# Patient Record
Sex: FEMALE | Race: OTHER | NOT HISPANIC OR LATINO | URBAN - METROPOLITAN AREA
[De-identification: names, ages, dates, MRNs, and addresses within clinical notes are randomized per-mention and may not be internally consistent; named-entity substitution may affect disease eponyms.]

---

## 2018-03-21 VITALS
DIASTOLIC BLOOD PRESSURE: 68 MMHG | SYSTOLIC BLOOD PRESSURE: 141 MMHG | OXYGEN SATURATION: 98 % | HEIGHT: 64 IN | RESPIRATION RATE: 16 BRPM | HEART RATE: 60 BPM | WEIGHT: 156.09 LBS

## 2018-03-21 NOTE — H&P ADULT - HISTORY OF PRESENT ILLNESS
67F c/o low back pain x    Presents today for elective Lami L5-S1, PSF L4-S1, possible PLIF, pelvic fixation, iliac crest bone graft 67F c/o low back pain worsened over time without improvement radiating down both legs, right worse than left. Denies numbness, tingling. Ambulates without assist. Presents today for elective Lami/PSF L4-S1, ICBG. 67F c/o low back pain worsened over time without improvement radiating down both legs, right worse than left. Denies numbness, tingling. Ambulates without assist. Presents today for elective Lami/PSF L4-S1, ICBG. Pt states itching with tylenol and codeine, takes aleve for pain.

## 2018-03-21 NOTE — H&P ADULT - NSHPPHYSICALEXAM_GEN_ALL_CORE
Head normal, atraumatic. Skin warm, dry and intact  MSK: +decreased ROM secondary to pain, lumbosacral spine    Remainder of physical exam per medical clearance note B/L LE: Decreased ROM secondary to pain, sensation intact, DP 2+, brisk cap refill, EHL/FHL/TA/GS 5/5  Rest of PE per MD clearance

## 2018-03-21 NOTE — H&P ADULT - PROBLEM SELECTOR PLAN 1
Admit to Orthopaedic Service.  Presents today for elective Lami L5-S1, PSF L4-S1, possible PLIF, pelvic fixation, iliac crest bone graft  Pt medically stable and cleared for procedure today by Admit to Orthopaedic Service.  Presents today for elective Lami L5-S1, PSF L4-S1, possible PLIF, pelvic fixation, iliac crest bone graft  Pt medically stable and cleared for procedure today by Dr Leonardo Mcdonough Admit to Orthopaedic Service.  Presents today for elective Lami/PSF L4-S1, ICBG.  Pt cleared for procedure today by Dr. Leonardo Mcdonough

## 2018-03-21 NOTE — PATIENT PROFILE ADULT. - NS PRO CONTRA FLU CONTRAIND
none Allergy sensitivity to eggs, thimerosal (if included in the vaccine), or any other component of the vaccine (i.e., aminoglycosides)

## 2018-03-21 NOTE — H&P ADULT - PMH
Asthma    GERD (gastroesophageal reflux disease)    HTN (hypertension)    Hypothyroidism    Spinal stenosis

## 2018-03-21 NOTE — H&P ADULT - NSHPLABSRESULTS_GEN_ALL_CORE
Preop CBC, CMP - WNL per medical clearance   Preop EKG - sinus rhythm; left atrial enlargement Preop CBC, CMP, PT/INR - WNL per medical clearance   UA to be collected DOS  Preop EKG - sinus rhythm; left atrial enlargement Preop CBC, CMP, PT/INR/PTT - WNL per medical clearance   UA, Nares dos  Preop EKG - sinus rhythm; left atrial enlargement

## 2018-03-21 NOTE — PATIENT PROFILE ADULT. - PMH
Spinal stenosis Asthma    GERD (gastroesophageal reflux disease)    HTN (hypertension)    Hypothyroidism    Spinal stenosis

## 2018-03-22 ENCOUNTER — INPATIENT (INPATIENT)
Facility: HOSPITAL | Age: 68
LOS: 3 days | Discharge: HOME | DRG: 460 | End: 2018-03-26
Attending: SPECIALIST | Admitting: SPECIALIST
Payer: COMMERCIAL

## 2018-03-22 ENCOUNTER — TRANSCRIPTION ENCOUNTER (OUTPATIENT)
Age: 68
End: 2018-03-22

## 2018-03-22 DIAGNOSIS — Z98.890 OTHER SPECIFIED POSTPROCEDURAL STATES: Chronic | ICD-10-CM

## 2018-03-22 DIAGNOSIS — E03.9 HYPOTHYROIDISM, UNSPECIFIED: ICD-10-CM

## 2018-03-22 DIAGNOSIS — D64.9 ANEMIA, UNSPECIFIED: ICD-10-CM

## 2018-03-22 DIAGNOSIS — J45.909 UNSPECIFIED ASTHMA, UNCOMPLICATED: ICD-10-CM

## 2018-03-22 DIAGNOSIS — M48.00 SPINAL STENOSIS, SITE UNSPECIFIED: ICD-10-CM

## 2018-03-22 DIAGNOSIS — I10 ESSENTIAL (PRIMARY) HYPERTENSION: ICD-10-CM

## 2018-03-22 DIAGNOSIS — K21.9 GASTRO-ESOPHAGEAL REFLUX DISEASE WITHOUT ESOPHAGITIS: ICD-10-CM

## 2018-03-22 DIAGNOSIS — R68.83 CHILLS (WITHOUT FEVER): ICD-10-CM

## 2018-03-22 LAB
ANION GAP SERPL CALC-SCNC: 10 MMOL/L — SIGNIFICANT CHANGE UP (ref 5–17)
APPEARANCE UR: CLEAR — SIGNIFICANT CHANGE UP
APTT BLD: 30.9 SEC — SIGNIFICANT CHANGE UP (ref 27.5–37.4)
BACTERIA # UR AUTO: PRESENT /HPF
BASE EXCESS BLDA CALC-SCNC: 1.2 MMOL/L — SIGNIFICANT CHANGE UP (ref -2–3)
BILIRUB UR-MCNC: NEGATIVE — SIGNIFICANT CHANGE UP
BUN SERPL-MCNC: 15 MG/DL — SIGNIFICANT CHANGE UP (ref 7–23)
CA-I BLDA-SCNC: 1.13 MMOL/L — SIGNIFICANT CHANGE UP (ref 1.12–1.3)
CALCIUM SERPL-MCNC: 7.9 MG/DL — LOW (ref 8.4–10.5)
CHLORIDE SERPL-SCNC: 105 MMOL/L — SIGNIFICANT CHANGE UP (ref 96–108)
CO2 SERPL-SCNC: 24 MMOL/L — SIGNIFICANT CHANGE UP (ref 22–31)
COHGB MFR BLDA: 0.3 % — SIGNIFICANT CHANGE UP
COLOR SPEC: YELLOW — SIGNIFICANT CHANGE UP
CREAT SERPL-MCNC: 0.51 MG/DL — SIGNIFICANT CHANGE UP (ref 0.5–1.3)
DIFF PNL FLD: NEGATIVE — SIGNIFICANT CHANGE UP
EPI CELLS # UR: SIGNIFICANT CHANGE UP /HPF (ref 0–5)
GAS PNL BLDA: SIGNIFICANT CHANGE UP
GLUCOSE BLDC GLUCOMTR-MCNC: 103 MG/DL — HIGH (ref 70–99)
GLUCOSE SERPL-MCNC: 169 MG/DL — HIGH (ref 70–99)
GLUCOSE UR QL: NEGATIVE — SIGNIFICANT CHANGE UP
HCO3 BLDA-SCNC: 25 MMOL/L — SIGNIFICANT CHANGE UP (ref 21–28)
HCT VFR BLD CALC: 29.9 % — LOW (ref 34.5–45)
HCT VFR BLD CALC: 34.9 % — SIGNIFICANT CHANGE UP (ref 34.5–45)
HGB BLD-MCNC: 11.3 G/DL — LOW (ref 11.5–15.5)
HGB BLD-MCNC: 9.7 G/DL — LOW (ref 11.5–15.5)
HGB BLDA-MCNC: 10.7 G/DL — LOW (ref 11.5–15.5)
INR BLD: 1.1 — SIGNIFICANT CHANGE UP (ref 0.88–1.16)
KETONES UR-MCNC: NEGATIVE — SIGNIFICANT CHANGE UP
LEUKOCYTE ESTERASE UR-ACNC: (no result)
LYMPHOCYTES # BLD AUTO: 7.4 % — LOW (ref 13–44)
MCHC RBC-ENTMCNC: 28.3 PG — SIGNIFICANT CHANGE UP (ref 27–34)
MCHC RBC-ENTMCNC: 28.9 PG — SIGNIFICANT CHANGE UP (ref 27–34)
MCHC RBC-ENTMCNC: 32.4 G/DL — SIGNIFICANT CHANGE UP (ref 32–36)
MCHC RBC-ENTMCNC: 32.4 G/DL — SIGNIFICANT CHANGE UP (ref 32–36)
MCV RBC AUTO: 87.3 FL — SIGNIFICANT CHANGE UP (ref 80–100)
MCV RBC AUTO: 89 FL — SIGNIFICANT CHANGE UP (ref 80–100)
METHGB MFR BLDA: 0.3 % — SIGNIFICANT CHANGE UP
MONOCYTES NFR BLD AUTO: 0.8 % — LOW (ref 2–14)
MRSA PCR RESULT.: NEGATIVE — SIGNIFICANT CHANGE UP
NEUTROPHILS NFR BLD AUTO: 91.8 % — HIGH (ref 43–77)
NITRITE UR-MCNC: NEGATIVE — SIGNIFICANT CHANGE UP
O2 CT VFR BLDA CALC: 15.2 ML/DL — SIGNIFICANT CHANGE UP (ref 15–23)
OXYHGB MFR BLDA: 98 % — SIGNIFICANT CHANGE UP (ref 94–100)
PCO2 BLDA: 38 MMHG — SIGNIFICANT CHANGE UP (ref 32–45)
PH BLDA: 7.44 — SIGNIFICANT CHANGE UP (ref 7.35–7.45)
PH UR: 6 — SIGNIFICANT CHANGE UP (ref 5–8)
PLATELET # BLD AUTO: 148 K/UL — LOW (ref 150–400)
PLATELET # BLD AUTO: 171 K/UL — SIGNIFICANT CHANGE UP (ref 150–400)
PO2 BLDA: 192 MMHG — HIGH (ref 83–108)
POTASSIUM BLDA-SCNC: 4 MMOL/L — SIGNIFICANT CHANGE UP (ref 3.5–4.9)
POTASSIUM SERPL-MCNC: 4.4 MMOL/L — SIGNIFICANT CHANGE UP (ref 3.5–5.3)
POTASSIUM SERPL-SCNC: 4.4 MMOL/L — SIGNIFICANT CHANGE UP (ref 3.5–5.3)
PROT UR-MCNC: NEGATIVE MG/DL — SIGNIFICANT CHANGE UP
PROTHROM AB SERPL-ACNC: 12.2 SEC — SIGNIFICANT CHANGE UP (ref 9.8–12.7)
RBC # BLD: 3.36 M/UL — LOW (ref 3.8–5.2)
RBC # BLD: 4 M/UL — SIGNIFICANT CHANGE UP (ref 3.8–5.2)
RBC # FLD: 13.8 % — SIGNIFICANT CHANGE UP (ref 10.3–16.9)
RBC # FLD: 13.9 % — SIGNIFICANT CHANGE UP (ref 10.3–16.9)
RBC CASTS # UR COMP ASSIST: < 5 /HPF — SIGNIFICANT CHANGE UP
S AUREUS DNA NOSE QL NAA+PROBE: NEGATIVE — SIGNIFICANT CHANGE UP
SAO2 % BLDA: 99 % — SIGNIFICANT CHANGE UP (ref 95–100)
SODIUM BLDA-SCNC: 136 MMOL/L — LOW (ref 138–146)
SODIUM SERPL-SCNC: 139 MMOL/L — SIGNIFICANT CHANGE UP (ref 135–145)
SP GR SPEC: >=1.03 — SIGNIFICANT CHANGE UP (ref 1–1.03)
UROBILINOGEN FLD QL: 0.2 E.U./DL — SIGNIFICANT CHANGE UP
WBC # BLD: 4.4 K/UL — SIGNIFICANT CHANGE UP (ref 3.8–10.5)
WBC # BLD: 8.8 K/UL — SIGNIFICANT CHANGE UP (ref 3.8–10.5)
WBC # FLD AUTO: 4.4 K/UL — SIGNIFICANT CHANGE UP (ref 3.8–10.5)
WBC # FLD AUTO: 8.8 K/UL — SIGNIFICANT CHANGE UP (ref 3.8–10.5)
WBC UR QL: (no result) /HPF

## 2018-03-22 RX ORDER — NALOXONE HYDROCHLORIDE 4 MG/.1ML
0.1 SPRAY NASAL
Qty: 0 | Refills: 0 | Status: DISCONTINUED | OUTPATIENT
Start: 2018-03-22 | End: 2018-03-26

## 2018-03-22 RX ORDER — BISOPROLOL FUMARATE 10 MG/1
1 TABLET, FILM COATED ORAL
Qty: 0 | Refills: 0 | COMMUNITY

## 2018-03-22 RX ORDER — MAGNESIUM HYDROXIDE 400 MG/1
30 TABLET, CHEWABLE ORAL EVERY 12 HOURS
Qty: 0 | Refills: 0 | Status: DISCONTINUED | OUTPATIENT
Start: 2018-03-22 | End: 2018-03-26

## 2018-03-22 RX ORDER — ERGOCALCIFEROL 1.25 MG/1
1 CAPSULE ORAL
Qty: 0 | Refills: 0 | COMMUNITY

## 2018-03-22 RX ORDER — SODIUM CHLORIDE 9 MG/ML
1000 INJECTION, SOLUTION INTRAVENOUS
Qty: 0 | Refills: 0 | Status: DISCONTINUED | OUTPATIENT
Start: 2018-03-22 | End: 2018-03-26

## 2018-03-22 RX ORDER — LEVOTHYROXINE SODIUM 125 MCG
1 TABLET ORAL
Qty: 0 | Refills: 0 | COMMUNITY

## 2018-03-22 RX ORDER — HYDROMORPHONE HYDROCHLORIDE 2 MG/ML
0.5 INJECTION INTRAMUSCULAR; INTRAVENOUS; SUBCUTANEOUS
Qty: 0 | Refills: 0 | Status: DISCONTINUED | OUTPATIENT
Start: 2018-03-22 | End: 2018-03-24

## 2018-03-22 RX ORDER — ALBUTEROL 90 UG/1
2 AEROSOL, METERED ORAL
Qty: 0 | Refills: 0 | COMMUNITY

## 2018-03-22 RX ORDER — INFLUENZA VIRUS VACCINE 15; 15; 15; 15 UG/.5ML; UG/.5ML; UG/.5ML; UG/.5ML
0.5 SUSPENSION INTRAMUSCULAR ONCE
Qty: 0 | Refills: 0 | Status: DISCONTINUED | OUTPATIENT
Start: 2018-03-22 | End: 2018-03-22

## 2018-03-22 RX ORDER — ALBUTEROL 90 UG/1
2 AEROSOL, METERED ORAL EVERY 6 HOURS
Qty: 0 | Refills: 0 | Status: DISCONTINUED | OUTPATIENT
Start: 2018-03-22 | End: 2018-03-26

## 2018-03-22 RX ORDER — AMLODIPINE BESYLATE 2.5 MG/1
5 TABLET ORAL DAILY
Qty: 0 | Refills: 0 | Status: DISCONTINUED | OUTPATIENT
Start: 2018-03-22 | End: 2018-03-26

## 2018-03-22 RX ORDER — ONDANSETRON 8 MG/1
4 TABLET, FILM COATED ORAL EVERY 6 HOURS
Qty: 0 | Refills: 0 | Status: DISCONTINUED | OUTPATIENT
Start: 2018-03-22 | End: 2018-03-26

## 2018-03-22 RX ORDER — AMLODIPINE BESYLATE 2.5 MG/1
1 TABLET ORAL
Qty: 0 | Refills: 0 | COMMUNITY

## 2018-03-22 RX ORDER — BISOPROLOL FUMARATE 10 MG/1
5 TABLET, FILM COATED ORAL DAILY
Qty: 0 | Refills: 0 | Status: DISCONTINUED | OUTPATIENT
Start: 2018-03-22 | End: 2018-03-26

## 2018-03-22 RX ORDER — ALBUMIN HUMAN 25 %
250 VIAL (ML) INTRAVENOUS
Qty: 0 | Refills: 0 | Status: DISCONTINUED | OUTPATIENT
Start: 2018-03-22 | End: 2018-03-26

## 2018-03-22 RX ORDER — HYDROMORPHONE HYDROCHLORIDE 2 MG/ML
30 INJECTION INTRAMUSCULAR; INTRAVENOUS; SUBCUTANEOUS
Qty: 0 | Refills: 0 | Status: DISCONTINUED | OUTPATIENT
Start: 2018-03-22 | End: 2018-03-24

## 2018-03-22 RX ORDER — METOCLOPRAMIDE HCL 10 MG
10 TABLET ORAL ONCE
Qty: 0 | Refills: 0 | Status: DISCONTINUED | OUTPATIENT
Start: 2018-03-22 | End: 2018-03-26

## 2018-03-22 RX ORDER — LEVOTHYROXINE SODIUM 125 MCG
100 TABLET ORAL DAILY
Qty: 0 | Refills: 0 | Status: DISCONTINUED | OUTPATIENT
Start: 2018-03-22 | End: 2018-03-26

## 2018-03-22 RX ORDER — LOSARTAN POTASSIUM 100 MG/1
1 TABLET, FILM COATED ORAL
Qty: 0 | Refills: 0 | COMMUNITY

## 2018-03-22 RX ORDER — CEFAZOLIN SODIUM 1 G
2000 VIAL (EA) INJECTION EVERY 8 HOURS
Qty: 0 | Refills: 0 | Status: COMPLETED | OUTPATIENT
Start: 2018-03-22 | End: 2018-03-23

## 2018-03-22 RX ORDER — LEVOTHYROXINE SODIUM 125 MCG
0 TABLET ORAL
Qty: 0 | Refills: 0 | COMMUNITY

## 2018-03-22 RX ORDER — DOCUSATE SODIUM 100 MG
100 CAPSULE ORAL THREE TIMES A DAY
Qty: 0 | Refills: 0 | Status: DISCONTINUED | OUTPATIENT
Start: 2018-03-22 | End: 2018-03-26

## 2018-03-22 RX ORDER — BUPIVACAINE 13.3 MG/ML
20 INJECTION, SUSPENSION, LIPOSOMAL INFILTRATION ONCE
Qty: 0 | Refills: 0 | Status: DISCONTINUED | OUTPATIENT
Start: 2018-03-22 | End: 2018-03-26

## 2018-03-22 RX ADMIN — HYDROMORPHONE HYDROCHLORIDE 30 MILLILITER(S): 2 INJECTION INTRAMUSCULAR; INTRAVENOUS; SUBCUTANEOUS at 15:37

## 2018-03-22 RX ADMIN — SODIUM CHLORIDE 125 MILLILITER(S): 9 INJECTION, SOLUTION INTRAVENOUS at 22:52

## 2018-03-22 RX ADMIN — Medication 100 MILLIGRAM(S): at 22:51

## 2018-03-22 RX ADMIN — Medication 100 MILLIGRAM(S): at 19:20

## 2018-03-22 RX ADMIN — SODIUM CHLORIDE 125 MILLILITER(S): 9 INJECTION, SOLUTION INTRAVENOUS at 17:21

## 2018-03-22 NOTE — DISCHARGE NOTE ADULT - ADDITIONAL INSTRUCTIONS
No strenuous activity (bending/twisting), heavy lifting, driving or returning to work until cleared by MD.  Change dressing daily with gauze/tape till post-op day #5, then leave incision open to air.  You may shower post-op day#5, keep incision clean and dry.   Try to have regular bowel movements, take stool softener or laxative if necessary.  May take pepcid or zantac for upset stomach.  May apply ice to affected areas to decrease swelling.  Call to schedule an appointment with Dr. Isaac for follow up, if you have staples or sutures they will be removed in office.  Contact your doctor if you experience: fever greater than 101.5, chills, chest pain, difficulty breathing, redness or excessive drainage around the incision, other concerns.  Follow up with your primary care provider. No strenuous activity (bending/twisting), heavy lifting, driving or returning to work until cleared by MD.  You may sponge-bathe, however keep dressing and incision site clean and dry.   Try to have regular bowel movements, take stool softener or laxative if necessary.  May take pepcid or zantac for upset stomach.  May apply ice to affected areas to decrease swelling.  Call to schedule an appointment with Dr. Isaac for follow up, if you have staples or sutures they will be removed in office.  Contact your doctor if you experience: fever greater than 101.5, chills, chest pain, difficulty breathing, redness or excessive drainage around the incision, other concerns.  Follow up with your primary care provider. No strenuous activity (bending/twisting), heavy lifting, driving or returning to work until cleared by MD.  You may sponge-bathe, however keep dressing and incision site clean and dry. You may shower on Tuesday, March 27th, 2018.  Try to have regular bowel movements, take stool softener or laxative if necessary.  May take pepcid or zantac for upset stomach.  May apply ice to affected areas to decrease swelling.  Call to schedule an appointment with Dr. Isaac for follow up, if you have staples or sutures they will be removed in office.  Contact your doctor if you experience: fever greater than 101.5, chills, chest pain, difficulty breathing, redness or excessive drainage around the incision, other concerns.  Follow up with your primary care provider.

## 2018-03-22 NOTE — CONSULT NOTE ADULT - SUBJECTIVE AND OBJECTIVE BOX
Pain Management Consult Note - [facility] [phone]    Chief Complaint: Low back pain    HPI: Called to evaluate 67 y.o. female c/o low back. Patient is s/p PSF L4-S1, POD#0. Seen laying in bed in pacu in NAD, c/o pain.      Pain is _x__ sharp ____dull ___burning __x_achy ___ Intensity: ____ mild _x__mod ___severe     Location _x___surgical site ____cervical ___x__lumbar ____abd ____upper ext____lower ext    Worse with __x__activity _x___movement _____physical therapy___ Rest    Improved with __x__medication ___x_rest ____physical therapy    ROS: Const:  _-__febrile   Eyes:___ENT:___CV: __-_chest pain  Resp: __-__sob  GI:___nausea ___vomiting ___abd pain ___npo ___clears __full diet __bm  :___ Musk: _x__pain ___spasm  Skin:___ Neuro:  ___sedation___confusion___ numbness ___weakness ___paresth  Psych:__anxiety  Endo:___ Heme:___Allergy:_________, _x__all others reviewed and negative    PAST MEDICAL & SURGICAL HISTORY:  GERD (gastroesophageal reflux disease)  HTN (hypertension)  Hypothyroidism  Asthma  Spinal stenosis  History of tonsillectomy  History of appendectomy  H/O umbilical hernia repair      SH: ___Tobacco   ___Alcohol                          FH:FAMILY HISTORY:      albumin human  5% IVPB 250 milliLiter(s) IV Intermittent every 2 hours  bisoprolol   Tablet 5 milliGRAM(s) Oral daily  BUpivacaine liposome 1.3% Injectable (no eMAR) 20 milliLiter(s) Local Injection once  HYDROmorphone PCA (1 mG/mL) 30 milliLiter(s) PCA Continuous PCA Continuous  HYDROmorphone PCA (1 mG/mL) Rescue Clinician Bolus 0.5 milliGRAM(s) IV Push every 2 hours PRN  naloxone Injectable 0.1 milliGRAM(s) IV Push every 3 minutes PRN  ondansetron Injectable 4 milliGRAM(s) IV Push every 6 hours PRN      T(C): 36.3 (03-22-18 @ 14:15), Max: 36.3 (03-22-18 @ 14:15)  HR: 68 (03-22-18 @ 14:30) (62 - 68)  BP: 108/61 (03-22-18 @ 14:30) (99/54 - 117/50)  RR: 20 (03-22-18 @ 14:30) (12 - 22)  SpO2: 100% (03-22-18 @ 14:30) (98% - 100%)  Wt(kg): --    T(C): 36.3 (03-22-18 @ 14:15), Max: 36.3 (03-22-18 @ 14:15)  HR: 68 (03-22-18 @ 14:30) (62 - 68)  BP: 108/61 (03-22-18 @ 14:30) (99/54 - 117/50)  RR: 20 (03-22-18 @ 14:30) (12 - 22)  SpO2: 100% (03-22-18 @ 14:30) (98% - 100%)  Wt(kg): --    T(C): 36.3 (03-22-18 @ 14:15), Max: 36.3 (03-22-18 @ 14:15)  HR: 68 (03-22-18 @ 14:30) (62 - 68)  BP: 108/61 (03-22-18 @ 14:30) (99/54 - 117/50)  RR: 20 (03-22-18 @ 14:30) (12 - 22)  SpO2: 100% (03-22-18 @ 14:30) (98% - 100%)  Wt(kg): --    PHYSICAL EXAM:  Gen Appearance: _x__no acute distress _x__appropriate        Neuro: _x__SILT feet____ EOM Intact Psych: AAOX_3_, _x__mood/affect appropriate        Eyes: __x_conjunctiva WNL  _____ Pupils equal and round        ENT: _x__ears and nose atraumatic_x__ Hearing grossly intact        Neck: ___trachea midline, no visible masses ___thyroid without palpable mass    Resp: _x__Nml WOB____No tactile fremitus ___clear to auscultation    Cardio: _x__extremities free from edema __x__pedal pulses palpable    GI/Abdomen: _x__soft ___x__ Nontender___x___Nondistended_____HSM    Lymphatic: _x__no palpable nodes in neck  ___no palpable nodes calves and feet    Skin/Wound: ___Incision, __x_Dressing c/d/i,   ___x_surrounding tissues soft,  ___drain/chest tube present____    Muscular: EHL _5__/5  Gastrocnemius__5_/5    __x_absent clubbing/cyanosis      ASSESSMENT: This is a 67y old Female with a history of   M48.06  No h/o HF  Handoff  GERD (gastroesophageal reflux disease)  HTN (hypertension)  Hypothyroidism  Asthma  Spinal stenosis  GERD (gastroesophageal reflux disease)  HTN (hypertension)  Hypothyroidism  Asthma  Spinal stenosis  History of tonsillectomy  History of appendectomy  H/O umbilical hernia repair      Recommended Treatment PLAN:    1. Dilaudid PCA 0.2mg Q6 minutes, with 0.5mg Q2 hour bolus  Plan discussed with Dr. Celaya

## 2018-03-22 NOTE — DISCHARGE NOTE ADULT - CARE PLAN
Principal Discharge DX:	Spinal stenosis  Goal:	Improvement after surgery.  Assessment and plan of treatment:	See below.

## 2018-03-22 NOTE — DISCHARGE NOTE ADULT - HOSPITAL COURSE
Admitted  Surgery PSF L4-S1  Shilpa-op Antibiotics  Pain control  DVT prophylaxis  OOB/Physical Therapy

## 2018-03-22 NOTE — DISCHARGE NOTE ADULT - MEDICATION SUMMARY - MEDICATIONS TO TAKE
I will START or STAY ON the medications listed below when I get home from the hospital:    oxyCODONE 5 mg oral tablet  -- 1 tab(s) by mouth every 4 hours, As Needed -for severe pain MDD:6tabs   -- Caution federal law prohibits the transfer of this drug to any person other  than the person for whom it was prescribed.  It is very important that you take or use this exactly as directed.  Do not skip doses or discontinue unless directed by your doctor.  May cause drowsiness.  Alcohol may intensify this effect.  Use care when operating dangerous machinery.  This prescription cannot be refilled.  Using more of this medication than prescribed may cause serious breathing problems.    -- Indication: For Pain    bisoprolol 5 mg oral tablet  -- 1 tab(s) by mouth once a day  -- Indication: For Hypertension    Ventolin HFA 90 mcg/inh inhalation aerosol  -- 2 puff(s) inhaled 4 times a day, As Needed  -- Indication: For Asthma    amLODIPine 5 mg oral tablet  -- 1 tab(s) by mouth once a day  -- Indication: For Hypertension    levothyroxine 100 mcg (0.1 mg) oral tablet  -- 1 tab(s) by mouth once a day  -- Indication: For Hypothyroidism

## 2018-03-22 NOTE — DISCHARGE NOTE ADULT - PATIENT PORTAL LINK FT
You can access the [facility] Patient Portal, offered by [facility], by registering with the following website: [email or web address]

## 2018-03-22 NOTE — DISCHARGE NOTE ADULT - CARE PROVIDER_API CALL
Shiva Isaac), Orthopaedic Surgery  [facility] [place] [address]  Phone: [phone]  Fax: [phone]

## 2018-03-23 LAB
ANION GAP SERPL CALC-SCNC: 8 MMOL/L — SIGNIFICANT CHANGE UP (ref 5–17)
BASOPHILS NFR BLD AUTO: 0 % — SIGNIFICANT CHANGE UP (ref 0–2)
BUN SERPL-MCNC: 11 MG/DL — SIGNIFICANT CHANGE UP (ref 7–23)
CALCIUM SERPL-MCNC: 8.7 MG/DL — SIGNIFICANT CHANGE UP (ref 8.4–10.5)
CHLORIDE SERPL-SCNC: 102 MMOL/L — SIGNIFICANT CHANGE UP (ref 96–108)
CO2 SERPL-SCNC: 29 MMOL/L — SIGNIFICANT CHANGE UP (ref 22–31)
CREAT SERPL-MCNC: 0.52 MG/DL — SIGNIFICANT CHANGE UP (ref 0.5–1.3)
EOSINOPHIL NFR BLD AUTO: 0 % — SIGNIFICANT CHANGE UP (ref 0–6)
GLUCOSE SERPL-MCNC: 117 MG/DL — HIGH (ref 70–99)
HCT VFR BLD CALC: 30.5 % — LOW (ref 34.5–45)
HGB BLD-MCNC: 9.9 G/DL — LOW (ref 11.5–15.5)
LYMPHOCYTES # BLD AUTO: 13.1 % — SIGNIFICANT CHANGE UP (ref 13–44)
MCHC RBC-ENTMCNC: 28.9 PG — SIGNIFICANT CHANGE UP (ref 27–34)
MCHC RBC-ENTMCNC: 32.5 G/DL — SIGNIFICANT CHANGE UP (ref 32–36)
MCV RBC AUTO: 89.2 FL — SIGNIFICANT CHANGE UP (ref 80–100)
MONOCYTES NFR BLD AUTO: 6.6 % — SIGNIFICANT CHANGE UP (ref 2–14)
NEUTROPHILS NFR BLD AUTO: 80.3 % — HIGH (ref 43–77)
PLATELET # BLD AUTO: 137 K/UL — LOW (ref 150–400)
POTASSIUM SERPL-MCNC: 4.5 MMOL/L — SIGNIFICANT CHANGE UP (ref 3.5–5.3)
POTASSIUM SERPL-SCNC: 4.5 MMOL/L — SIGNIFICANT CHANGE UP (ref 3.5–5.3)
RBC # BLD: 3.42 M/UL — LOW (ref 3.8–5.2)
RBC # FLD: 14.7 % — SIGNIFICANT CHANGE UP (ref 10.3–16.9)
SODIUM SERPL-SCNC: 139 MMOL/L — SIGNIFICANT CHANGE UP (ref 135–145)
WBC # BLD: 9.7 K/UL — SIGNIFICANT CHANGE UP (ref 3.8–10.5)
WBC # FLD AUTO: 9.7 K/UL — SIGNIFICANT CHANGE UP (ref 3.8–10.5)

## 2018-03-23 RX ORDER — LORATADINE 10 MG/1
10 TABLET ORAL DAILY
Qty: 0 | Refills: 0 | Status: DISCONTINUED | OUTPATIENT
Start: 2018-03-23 | End: 2018-03-26

## 2018-03-23 RX ADMIN — HYDROMORPHONE HYDROCHLORIDE 30 MILLILITER(S): 2 INJECTION INTRAMUSCULAR; INTRAVENOUS; SUBCUTANEOUS at 23:00

## 2018-03-23 RX ADMIN — SODIUM CHLORIDE 125 MILLILITER(S): 9 INJECTION, SOLUTION INTRAVENOUS at 07:28

## 2018-03-23 RX ADMIN — Medication 100 MILLIGRAM(S): at 07:27

## 2018-03-23 RX ADMIN — Medication 1 DROP(S): at 07:28

## 2018-03-23 RX ADMIN — Medication 100 MICROGRAM(S): at 07:27

## 2018-03-23 RX ADMIN — Medication 100 MILLIGRAM(S): at 22:01

## 2018-03-23 RX ADMIN — BISOPROLOL FUMARATE 5 MILLIGRAM(S): 10 TABLET, FILM COATED ORAL at 07:27

## 2018-03-23 RX ADMIN — LORATADINE 10 MILLIGRAM(S): 10 TABLET ORAL at 18:34

## 2018-03-23 RX ADMIN — Medication 1 DROP(S): at 16:45

## 2018-03-23 RX ADMIN — AMLODIPINE BESYLATE 5 MILLIGRAM(S): 2.5 TABLET ORAL at 07:27

## 2018-03-23 RX ADMIN — Medication 100 MILLIGRAM(S): at 03:15

## 2018-03-23 RX ADMIN — Medication 1 DROP(S): at 22:01

## 2018-03-23 RX ADMIN — ONDANSETRON 4 MILLIGRAM(S): 8 TABLET, FILM COATED ORAL at 14:23

## 2018-03-23 RX ADMIN — Medication 1 DROP(S): at 01:24

## 2018-03-24 DIAGNOSIS — R42 DIZZINESS AND GIDDINESS: ICD-10-CM

## 2018-03-24 DIAGNOSIS — E87.1 HYPO-OSMOLALITY AND HYPONATREMIA: ICD-10-CM

## 2018-03-24 LAB
ANION GAP SERPL CALC-SCNC: 7 MMOL/L — SIGNIFICANT CHANGE UP (ref 5–17)
BASOPHILS NFR BLD AUTO: 0.1 % — SIGNIFICANT CHANGE UP (ref 0–2)
BUN SERPL-MCNC: 8 MG/DL — SIGNIFICANT CHANGE UP (ref 7–23)
CALCIUM SERPL-MCNC: 8.8 MG/DL — SIGNIFICANT CHANGE UP (ref 8.4–10.5)
CHLORIDE SERPL-SCNC: 98 MMOL/L — SIGNIFICANT CHANGE UP (ref 96–108)
CO2 SERPL-SCNC: 29 MMOL/L — SIGNIFICANT CHANGE UP (ref 22–31)
CREAT SERPL-MCNC: 0.53 MG/DL — SIGNIFICANT CHANGE UP (ref 0.5–1.3)
EOSINOPHIL NFR BLD AUTO: 0.4 % — SIGNIFICANT CHANGE UP (ref 0–6)
GLUCOSE SERPL-MCNC: 115 MG/DL — HIGH (ref 70–99)
HCT VFR BLD CALC: 29.6 % — LOW (ref 34.5–45)
HGB BLD-MCNC: 9.7 G/DL — LOW (ref 11.5–15.5)
LYMPHOCYTES # BLD AUTO: 14.5 % — SIGNIFICANT CHANGE UP (ref 13–44)
MCHC RBC-ENTMCNC: 28.9 PG — SIGNIFICANT CHANGE UP (ref 27–34)
MCHC RBC-ENTMCNC: 32.8 G/DL — SIGNIFICANT CHANGE UP (ref 32–36)
MCV RBC AUTO: 88.1 FL — SIGNIFICANT CHANGE UP (ref 80–100)
MONOCYTES NFR BLD AUTO: 7.1 % — SIGNIFICANT CHANGE UP (ref 2–14)
NEUTROPHILS NFR BLD AUTO: 77.9 % — HIGH (ref 43–77)
PLATELET # BLD AUTO: 116 K/UL — LOW (ref 150–400)
POTASSIUM SERPL-MCNC: 3.9 MMOL/L — SIGNIFICANT CHANGE UP (ref 3.5–5.3)
POTASSIUM SERPL-SCNC: 3.9 MMOL/L — SIGNIFICANT CHANGE UP (ref 3.5–5.3)
RBC # BLD: 3.36 M/UL — LOW (ref 3.8–5.2)
RBC # FLD: 13.8 % — SIGNIFICANT CHANGE UP (ref 10.3–16.9)
SODIUM SERPL-SCNC: 134 MMOL/L — LOW (ref 135–145)
WBC # BLD: 11 K/UL — HIGH (ref 3.8–10.5)
WBC # FLD AUTO: 11 K/UL — HIGH (ref 3.8–10.5)

## 2018-03-24 RX ORDER — MORPHINE SULFATE 50 MG/1
4 CAPSULE, EXTENDED RELEASE ORAL
Qty: 0 | Refills: 0 | Status: DISCONTINUED | OUTPATIENT
Start: 2018-03-24 | End: 2018-03-26

## 2018-03-24 RX ORDER — OXYCODONE HYDROCHLORIDE 5 MG/1
10 TABLET ORAL
Qty: 0 | Refills: 0 | Status: DISCONTINUED | OUTPATIENT
Start: 2018-03-24 | End: 2018-03-26

## 2018-03-24 RX ORDER — ACETAMINOPHEN 500 MG
650 TABLET ORAL EVERY 6 HOURS
Qty: 0 | Refills: 0 | Status: DISCONTINUED | OUTPATIENT
Start: 2018-03-24 | End: 2018-03-26

## 2018-03-24 RX ORDER — SODIUM CHLORIDE 9 MG/ML
1000 INJECTION INTRAMUSCULAR; INTRAVENOUS; SUBCUTANEOUS ONCE
Qty: 0 | Refills: 0 | Status: COMPLETED | OUTPATIENT
Start: 2018-03-24 | End: 2018-03-24

## 2018-03-24 RX ORDER — OXYCODONE HYDROCHLORIDE 5 MG/1
5 TABLET ORAL
Qty: 0 | Refills: 0 | Status: DISCONTINUED | OUTPATIENT
Start: 2018-03-24 | End: 2018-03-26

## 2018-03-24 RX ADMIN — Medication 1 TABLET(S): at 10:58

## 2018-03-24 RX ADMIN — Medication 100 MICROGRAM(S): at 07:00

## 2018-03-24 RX ADMIN — OXYCODONE HYDROCHLORIDE 10 MILLIGRAM(S): 5 TABLET ORAL at 10:30

## 2018-03-24 RX ADMIN — AMLODIPINE BESYLATE 5 MILLIGRAM(S): 2.5 TABLET ORAL at 07:00

## 2018-03-24 RX ADMIN — OXYCODONE HYDROCHLORIDE 5 MILLIGRAM(S): 5 TABLET ORAL at 16:00

## 2018-03-24 RX ADMIN — OXYCODONE HYDROCHLORIDE 5 MILLIGRAM(S): 5 TABLET ORAL at 16:30

## 2018-03-24 RX ADMIN — Medication 1 DROP(S): at 07:01

## 2018-03-24 RX ADMIN — Medication 100 MILLIGRAM(S): at 07:00

## 2018-03-24 RX ADMIN — Medication 100 MILLIGRAM(S): at 21:15

## 2018-03-24 RX ADMIN — Medication 100 MILLIGRAM(S): at 16:01

## 2018-03-24 RX ADMIN — LORATADINE 10 MILLIGRAM(S): 10 TABLET ORAL at 10:58

## 2018-03-24 RX ADMIN — OXYCODONE HYDROCHLORIDE 10 MILLIGRAM(S): 5 TABLET ORAL at 11:00

## 2018-03-24 RX ADMIN — OXYCODONE HYDROCHLORIDE 5 MILLIGRAM(S): 5 TABLET ORAL at 23:22

## 2018-03-24 RX ADMIN — SODIUM CHLORIDE 1000 MILLILITER(S): 9 INJECTION INTRAMUSCULAR; INTRAVENOUS; SUBCUTANEOUS at 14:43

## 2018-03-24 RX ADMIN — ONDANSETRON 4 MILLIGRAM(S): 8 TABLET, FILM COATED ORAL at 09:53

## 2018-03-24 RX ADMIN — BISOPROLOL FUMARATE 5 MILLIGRAM(S): 10 TABLET, FILM COATED ORAL at 07:00

## 2018-03-24 NOTE — PHYSICAL THERAPY INITIAL EVALUATION ADULT - PERTINENT HX OF CURRENT PROBLEM, REHAB EVAL
Patient is a 67 year old F who presents with complaints of chronic low back pain that has progressively worsened over time without improvement. Presents today for elective lumbar laminectomy/PSF L4-S1

## 2018-03-24 NOTE — PHYSICAL THERAPY INITIAL EVALUATION ADULT - GENERAL OBSERVATIONS, REHAB EVAL
Received sitting in bedside chair with +CRISTOPHER drain x 3, +malagon cathter, on room air, family present, in no apparent distress. Patient denies pain. Surgical dressing c/d/i
Pt received supine in bed, +pca, malagon, 3 hemovacs, +IV, +scds.

## 2018-03-24 NOTE — PHYSICAL THERAPY INITIAL EVALUATION ADULT - RANGE OF MOTION EXAMINATION, REHAB EVAL
bilateral upper extremity ROM was WFL (within functional limits)/bilateral lower extremity ROM was WFL (within functional limits)
no ROM deficits were identified

## 2018-03-24 NOTE — PHYSICAL THERAPY INITIAL EVALUATION ADULT - PLANNED THERAPY INTERVENTIONS, PT EVAL
bed mobility training/gait training/transfer training
postural re-education/transfer training/balance training/bed mobility training/gait training/ROM

## 2018-03-24 NOTE — PHYSICAL THERAPY INITIAL EVALUATION ADULT - DIAGNOSIS, PT EVAL
Practice Pattern 4F: Impaired muscle performance, motor function, joint mobility and range of motion associated with spinal disorders
4F: Impaired Joint Mobility, Motor Function, Muscle Performance, and Range of Motion and Reflex Integrity Associated with Spinal Disorders

## 2018-03-24 NOTE — PHYSICAL THERAPY INITIAL EVALUATION ADULT - GAIT DEVIATIONS NOTED, PT EVAL
increased time in double stance/decreased zach/decreased step length/decreased stride length
decreased step length/decreased zach/increased time in double stance

## 2018-03-24 NOTE — PHYSICAL THERAPY INITIAL EVALUATION ADULT - IMPAIRMENTS FOUND, PT EVAL
aerobic capacity/endurance/gait, locomotion, and balance
aerobic capacity/endurance/gait, locomotion, and balance/posture/joint integrity and mobility/muscle strength/ROM

## 2018-03-24 NOTE — PHYSICAL THERAPY INITIAL EVALUATION ADULT - PATIENT/FAMILY/SIGNIFICANT OTHER GOALS STATEMENT, PT EVAL
Patient is willing and motivated to participate in physical therapy and would like to get up and walk
to go home, to get better

## 2018-03-24 NOTE — PHYSICAL THERAPY INITIAL EVALUATION ADULT - MANUAL MUSCLE TESTING RESULTS, REHAB EVAL
Strength grossly at least 3/5 based on functional assessment of bed mobility, transfers, and ambulation
no strength deficits were identified

## 2018-03-25 LAB
ANION GAP SERPL CALC-SCNC: 6 MMOL/L — SIGNIFICANT CHANGE UP (ref 5–17)
BASOPHILS NFR BLD AUTO: 0.1 % — SIGNIFICANT CHANGE UP (ref 0–2)
BUN SERPL-MCNC: 8 MG/DL — SIGNIFICANT CHANGE UP (ref 7–23)
CALCIUM SERPL-MCNC: 8.6 MG/DL — SIGNIFICANT CHANGE UP (ref 8.4–10.5)
CHLORIDE SERPL-SCNC: 101 MMOL/L — SIGNIFICANT CHANGE UP (ref 96–108)
CO2 SERPL-SCNC: 31 MMOL/L — SIGNIFICANT CHANGE UP (ref 22–31)
CREAT SERPL-MCNC: 0.53 MG/DL — SIGNIFICANT CHANGE UP (ref 0.5–1.3)
EOSINOPHIL NFR BLD AUTO: 1 % — SIGNIFICANT CHANGE UP (ref 0–6)
GLUCOSE SERPL-MCNC: 92 MG/DL — SIGNIFICANT CHANGE UP (ref 70–99)
HCT VFR BLD CALC: 28.5 % — LOW (ref 34.5–45)
HGB BLD-MCNC: 9.2 G/DL — LOW (ref 11.5–15.5)
LYMPHOCYTES # BLD AUTO: 19.6 % — SIGNIFICANT CHANGE UP (ref 13–44)
MCHC RBC-ENTMCNC: 29.2 PG — SIGNIFICANT CHANGE UP (ref 27–34)
MCHC RBC-ENTMCNC: 32.3 G/DL — SIGNIFICANT CHANGE UP (ref 32–36)
MCV RBC AUTO: 90.5 FL — SIGNIFICANT CHANGE UP (ref 80–100)
MONOCYTES NFR BLD AUTO: 6.1 % — SIGNIFICANT CHANGE UP (ref 2–14)
NEUTROPHILS NFR BLD AUTO: 73.2 % — SIGNIFICANT CHANGE UP (ref 43–77)
PLATELET # BLD AUTO: 110 K/UL — LOW (ref 150–400)
POTASSIUM SERPL-MCNC: 3.6 MMOL/L — SIGNIFICANT CHANGE UP (ref 3.5–5.3)
POTASSIUM SERPL-SCNC: 3.6 MMOL/L — SIGNIFICANT CHANGE UP (ref 3.5–5.3)
RBC # BLD: 3.15 M/UL — LOW (ref 3.8–5.2)
RBC # FLD: 13.9 % — SIGNIFICANT CHANGE UP (ref 10.3–16.9)
SODIUM SERPL-SCNC: 138 MMOL/L — SIGNIFICANT CHANGE UP (ref 135–145)
WBC # BLD: 8 K/UL — SIGNIFICANT CHANGE UP (ref 3.8–10.5)
WBC # FLD AUTO: 8 K/UL — SIGNIFICANT CHANGE UP (ref 3.8–10.5)

## 2018-03-25 PROCEDURE — 72100 X-RAY EXAM L-S SPINE 2/3 VWS: CPT | Mod: 26

## 2018-03-25 RX ORDER — POTASSIUM CHLORIDE 20 MEQ
40 PACKET (EA) ORAL ONCE
Qty: 0 | Refills: 0 | Status: COMPLETED | OUTPATIENT
Start: 2018-03-25 | End: 2018-03-25

## 2018-03-25 RX ADMIN — LORATADINE 10 MILLIGRAM(S): 10 TABLET ORAL at 09:33

## 2018-03-25 RX ADMIN — OXYCODONE HYDROCHLORIDE 5 MILLIGRAM(S): 5 TABLET ORAL at 20:44

## 2018-03-25 RX ADMIN — OXYCODONE HYDROCHLORIDE 5 MILLIGRAM(S): 5 TABLET ORAL at 05:41

## 2018-03-25 RX ADMIN — Medication 100 MILLIGRAM(S): at 13:39

## 2018-03-25 RX ADMIN — MAGNESIUM HYDROXIDE 30 MILLILITER(S): 400 TABLET, CHEWABLE ORAL at 13:39

## 2018-03-25 RX ADMIN — Medication 5 MILLIGRAM(S): at 17:24

## 2018-03-25 RX ADMIN — OXYCODONE HYDROCHLORIDE 5 MILLIGRAM(S): 5 TABLET ORAL at 00:22

## 2018-03-25 RX ADMIN — Medication 100 MICROGRAM(S): at 06:50

## 2018-03-25 RX ADMIN — Medication 1 TABLET(S): at 09:33

## 2018-03-25 RX ADMIN — OXYCODONE HYDROCHLORIDE 5 MILLIGRAM(S): 5 TABLET ORAL at 14:27

## 2018-03-25 RX ADMIN — OXYCODONE HYDROCHLORIDE 5 MILLIGRAM(S): 5 TABLET ORAL at 10:27

## 2018-03-25 RX ADMIN — Medication 100 MILLIGRAM(S): at 21:30

## 2018-03-25 RX ADMIN — OXYCODONE HYDROCHLORIDE 5 MILLIGRAM(S): 5 TABLET ORAL at 11:30

## 2018-03-25 RX ADMIN — BISOPROLOL FUMARATE 5 MILLIGRAM(S): 10 TABLET, FILM COATED ORAL at 06:50

## 2018-03-25 RX ADMIN — AMLODIPINE BESYLATE 5 MILLIGRAM(S): 2.5 TABLET ORAL at 06:50

## 2018-03-25 RX ADMIN — Medication 100 MILLIGRAM(S): at 06:50

## 2018-03-25 RX ADMIN — Medication 40 MILLIEQUIVALENT(S): at 09:32

## 2018-03-25 RX ADMIN — OXYCODONE HYDROCHLORIDE 5 MILLIGRAM(S): 5 TABLET ORAL at 13:39

## 2018-03-25 RX ADMIN — OXYCODONE HYDROCHLORIDE 5 MILLIGRAM(S): 5 TABLET ORAL at 04:41

## 2018-03-25 RX ADMIN — OXYCODONE HYDROCHLORIDE 5 MILLIGRAM(S): 5 TABLET ORAL at 19:43

## 2018-03-26 VITALS
HEART RATE: 76 BPM | OXYGEN SATURATION: 99 % | RESPIRATION RATE: 17 BRPM | DIASTOLIC BLOOD PRESSURE: 77 MMHG | SYSTOLIC BLOOD PRESSURE: 125 MMHG | TEMPERATURE: 98 F

## 2018-03-26 LAB
ANION GAP SERPL CALC-SCNC: 5 MMOL/L — SIGNIFICANT CHANGE UP (ref 5–17)
BUN SERPL-MCNC: 9 MG/DL — SIGNIFICANT CHANGE UP (ref 7–23)
CALCIUM SERPL-MCNC: 8.9 MG/DL — SIGNIFICANT CHANGE UP (ref 8.4–10.5)
CHLORIDE SERPL-SCNC: 101 MMOL/L — SIGNIFICANT CHANGE UP (ref 96–108)
CO2 SERPL-SCNC: 33 MMOL/L — HIGH (ref 22–31)
CREAT SERPL-MCNC: 0.53 MG/DL — SIGNIFICANT CHANGE UP (ref 0.5–1.3)
GLUCOSE SERPL-MCNC: 103 MG/DL — HIGH (ref 70–99)
HCT VFR BLD CALC: 28.4 % — LOW (ref 34.5–45)
HGB BLD-MCNC: 9.2 G/DL — LOW (ref 11.5–15.5)
MCHC RBC-ENTMCNC: 29.1 PG — SIGNIFICANT CHANGE UP (ref 27–34)
MCHC RBC-ENTMCNC: 32.4 G/DL — SIGNIFICANT CHANGE UP (ref 32–36)
MCV RBC AUTO: 89.9 FL — SIGNIFICANT CHANGE UP (ref 80–100)
PLATELET # BLD AUTO: 140 K/UL — LOW (ref 150–400)
POTASSIUM SERPL-MCNC: 4.1 MMOL/L — SIGNIFICANT CHANGE UP (ref 3.5–5.3)
POTASSIUM SERPL-SCNC: 4.1 MMOL/L — SIGNIFICANT CHANGE UP (ref 3.5–5.3)
RBC # BLD: 3.16 M/UL — LOW (ref 3.8–5.2)
RBC # FLD: 13.5 % — SIGNIFICANT CHANGE UP (ref 10.3–16.9)
SODIUM SERPL-SCNC: 139 MMOL/L — SIGNIFICANT CHANGE UP (ref 135–145)
WBC # BLD: 6.3 K/UL — SIGNIFICANT CHANGE UP (ref 3.8–10.5)
WBC # FLD AUTO: 6.3 K/UL — SIGNIFICANT CHANGE UP (ref 3.8–10.5)

## 2018-03-26 RX ORDER — MULTIVIT WITH MIN/MFOLATE/K2 340-15/3 G
300 POWDER (GRAM) ORAL ONCE
Qty: 0 | Refills: 0 | Status: COMPLETED | OUTPATIENT
Start: 2018-03-26 | End: 2018-03-26

## 2018-03-26 RX ORDER — OXYCODONE HYDROCHLORIDE 5 MG/1
1 TABLET ORAL
Qty: 42 | Refills: 0 | OUTPATIENT
Start: 2018-03-26 | End: 2018-04-01

## 2018-03-26 RX ADMIN — Medication 100 MICROGRAM(S): at 06:40

## 2018-03-26 RX ADMIN — OXYCODONE HYDROCHLORIDE 5 MILLIGRAM(S): 5 TABLET ORAL at 00:30

## 2018-03-26 RX ADMIN — OXYCODONE HYDROCHLORIDE 5 MILLIGRAM(S): 5 TABLET ORAL at 10:15

## 2018-03-26 RX ADMIN — OXYCODONE HYDROCHLORIDE 5 MILLIGRAM(S): 5 TABLET ORAL at 13:29

## 2018-03-26 RX ADMIN — AMLODIPINE BESYLATE 5 MILLIGRAM(S): 2.5 TABLET ORAL at 06:40

## 2018-03-26 RX ADMIN — OXYCODONE HYDROCHLORIDE 5 MILLIGRAM(S): 5 TABLET ORAL at 11:08

## 2018-03-26 RX ADMIN — OXYCODONE HYDROCHLORIDE 5 MILLIGRAM(S): 5 TABLET ORAL at 07:40

## 2018-03-26 RX ADMIN — OXYCODONE HYDROCHLORIDE 5 MILLIGRAM(S): 5 TABLET ORAL at 09:28

## 2018-03-26 RX ADMIN — Medication 1 TABLET(S): at 09:28

## 2018-03-26 RX ADMIN — OXYCODONE HYDROCHLORIDE 5 MILLIGRAM(S): 5 TABLET ORAL at 14:18

## 2018-03-26 RX ADMIN — Medication 100 MILLIGRAM(S): at 06:40

## 2018-03-26 RX ADMIN — OXYCODONE HYDROCHLORIDE 5 MILLIGRAM(S): 5 TABLET ORAL at 01:30

## 2018-03-26 RX ADMIN — OXYCODONE HYDROCHLORIDE 5 MILLIGRAM(S): 5 TABLET ORAL at 10:43

## 2018-03-26 RX ADMIN — Medication 300 MILLILITER(S): at 11:30

## 2018-03-26 RX ADMIN — BISOPROLOL FUMARATE 5 MILLIGRAM(S): 10 TABLET, FILM COATED ORAL at 06:40

## 2018-03-26 RX ADMIN — OXYCODONE HYDROCHLORIDE 5 MILLIGRAM(S): 5 TABLET ORAL at 06:40

## 2018-03-26 RX ADMIN — Medication 100 MILLIGRAM(S): at 13:24

## 2018-03-26 RX ADMIN — LORATADINE 10 MILLIGRAM(S): 10 TABLET ORAL at 09:28

## 2018-03-26 NOTE — PROGRESS NOTE ADULT - PROBLEM SELECTOR PLAN 8
associated with antihypertensives. BP  now normal. Resolved
associated with antihypertensives. BP  now normal
associated with antihypertensives. BP  now normal

## 2018-03-26 NOTE — PROGRESS NOTE ADULT - PROVIDER SPECIALTY LIST ADULT
Critical Care
Orthopedics
Pain Medicine
Orthopedics
Pain Medicine
Critical Care

## 2018-03-26 NOTE — PROGRESS NOTE ADULT - PROBLEM SELECTOR PLAN 5
on plateau
received one PRBC, and cell saver.
being followed
being followed
received one PRBC, and cell saver.

## 2018-03-27 PROCEDURE — 85025 COMPLETE CBC W/AUTO DIFF WBC: CPT

## 2018-03-27 PROCEDURE — C1889: CPT

## 2018-03-27 PROCEDURE — C1713: CPT

## 2018-03-27 PROCEDURE — 95940 IONM IN OPERATNG ROOM 15 MIN: CPT

## 2018-03-27 PROCEDURE — 86923 COMPATIBILITY TEST ELECTRIC: CPT

## 2018-03-27 PROCEDURE — 85027 COMPLETE CBC AUTOMATED: CPT

## 2018-03-27 PROCEDURE — 85610 PROTHROMBIN TIME: CPT

## 2018-03-27 PROCEDURE — 81001 URINALYSIS AUTO W/SCOPE: CPT

## 2018-03-27 PROCEDURE — 80048 BASIC METABOLIC PNL TOTAL CA: CPT

## 2018-03-27 PROCEDURE — 84132 ASSAY OF SERUM POTASSIUM: CPT

## 2018-03-27 PROCEDURE — 86850 RBC ANTIBODY SCREEN: CPT

## 2018-03-27 PROCEDURE — P9045: CPT

## 2018-03-27 PROCEDURE — 87641 MR-STAPH DNA AMP PROBE: CPT

## 2018-03-27 PROCEDURE — 97116 GAIT TRAINING THERAPY: CPT

## 2018-03-27 PROCEDURE — 72100 X-RAY EXAM L-S SPINE 2/3 VWS: CPT

## 2018-03-27 PROCEDURE — 36415 COLL VENOUS BLD VENIPUNCTURE: CPT

## 2018-03-27 PROCEDURE — 85018 HEMOGLOBIN: CPT

## 2018-03-27 PROCEDURE — 82330 ASSAY OF CALCIUM: CPT

## 2018-03-27 PROCEDURE — 86900 BLOOD TYPING SEROLOGIC ABO: CPT

## 2018-03-27 PROCEDURE — 85730 THROMBOPLASTIN TIME PARTIAL: CPT

## 2018-03-27 PROCEDURE — 76000 FLUOROSCOPY <1 HR PHYS/QHP: CPT

## 2018-03-27 PROCEDURE — C1769: CPT

## 2018-03-27 PROCEDURE — 82962 GLUCOSE BLOOD TEST: CPT

## 2018-03-27 PROCEDURE — 36430 TRANSFUSION BLD/BLD COMPNT: CPT

## 2018-03-27 PROCEDURE — 86901 BLOOD TYPING SEROLOGIC RH(D): CPT

## 2018-03-27 PROCEDURE — P9016: CPT

## 2018-03-27 PROCEDURE — 97161 PT EVAL LOW COMPLEX 20 MIN: CPT

## 2018-03-27 PROCEDURE — 84295 ASSAY OF SERUM SODIUM: CPT

## 2018-03-28 DIAGNOSIS — M51.36 OTHER INTERVERTEBRAL DISC DEGENERATION, LUMBAR REGION: ICD-10-CM

## 2018-03-28 DIAGNOSIS — M48.061 SPINAL STENOSIS, LUMBAR REGION WITHOUT NEUROGENIC CLAUDICATION: ICD-10-CM

## 2018-03-28 DIAGNOSIS — E03.9 HYPOTHYROIDISM, UNSPECIFIED: ICD-10-CM

## 2018-03-28 DIAGNOSIS — J45.909 UNSPECIFIED ASTHMA, UNCOMPLICATED: ICD-10-CM

## 2018-03-28 DIAGNOSIS — K21.9 GASTRO-ESOPHAGEAL REFLUX DISEASE WITHOUT ESOPHAGITIS: ICD-10-CM

## 2018-03-28 DIAGNOSIS — M48.07 SPINAL STENOSIS, LUMBOSACRAL REGION: ICD-10-CM

## 2018-03-28 DIAGNOSIS — M51.37 OTHER INTERVERTEBRAL DISC DEGENERATION, LUMBOSACRAL REGION: ICD-10-CM

## 2018-03-28 DIAGNOSIS — M43.17 SPONDYLOLISTHESIS, LUMBOSACRAL REGION: ICD-10-CM

## 2018-03-28 DIAGNOSIS — Z88.6 ALLERGY STATUS TO ANALGESIC AGENT: ICD-10-CM

## 2018-03-28 DIAGNOSIS — D64.9 ANEMIA, UNSPECIFIED: ICD-10-CM

## 2018-03-28 DIAGNOSIS — Z88.5 ALLERGY STATUS TO NARCOTIC AGENT: ICD-10-CM

## 2018-03-28 DIAGNOSIS — E87.1 HYPO-OSMOLALITY AND HYPONATREMIA: ICD-10-CM

## 2018-03-28 DIAGNOSIS — I10 ESSENTIAL (PRIMARY) HYPERTENSION: ICD-10-CM

## 2019-02-17 NOTE — PROGRESS NOTE ADULT - SUBJECTIVE AND OBJECTIVE BOX
Pain Management Progress Note - [facility] [phone]  Pt was seen at 08:15.  HPI:  This is a 67y old Female with a history of GERD, HTN, Hypothyroidism, Asthma, Spinal stenosis with chronic back pain s/p PSF L4-S1 on 3/22/18. Patient states that her pain has been improved with current regimen over the weekend. she has been taking Oxycodone 5mg q4h regularly.        Pertinent PMH: Pain at: _x__Back ___Neck___Knee ___Hip ___Shoulder ___ Opioid tolerance    Pain is __x_ sharp ____dull ___burning ___achy ___ Intensity: ____ mild ____mod ___x_severe     Location ___x__surgical site _____cervical ___x__lumbar ____abd _____upper ext____lower ext    Worse with __x__activity ___x_movement _____physical therapy___ Rest    Improved with __x__medication __x__rest ____physical therapy    influenza   Vaccine  lactated ringers.  ceFAZolin   IVPB  aluminum hydroxide/magnesium hydroxide/simethicone Suspension  metoclopramide Injectable  docusate sodium  magnesium hydroxide Suspension  multivitamin  amLODIPine   Tablet  levothyroxine  ALBUTerol    90 MICROgram(s) HFA Inhaler  bisoprolol   Tablet  HYDROmorphone PCA (1 mG/mL)  HYDROmorphone PCA (1 mG/mL) Rescue Clinician Bolus  naloxone Injectable  ondansetron Injectable  (ADM OVERRIDE)  artificial  tears Solution  loratadine  oxyCODONE    IR  oxyCODONE    IR  morphine  - Injectable  acetaminophen   Tablet  acetaminophen   Tablet.  sodium chloride 0.9% Bolus  potassium chloride   Powder  bisacodyl      ROS: Const:  _-__febrile   Eyes:___ENT:___CV: ___chest pain  Resp: ____sob  GI:__-_nausea _-__vomiting __-__abd pain ___npo ___clears ___full diet __bm  :___ Musk: _+__pain _+__spasm  Skin:___ Neuro:  _-__qhzemoxt__-_yljdobdkg_-___ numbness __-_weakness ___paresthesia  Psych:___anxiety  Endo:___ Heme:___Allergy:___      03-26 @ 07:4999 mL/min/1.73M2    Hemoglobin: 9.2 g/dL (03-26 @ 07:49)  Hemoglobin: 9.2 g/dL (03-25 @ 06:17)    T(C): 36.9 (03-26-18 @ 09:18), Max: 37.4 (03-25-18 @ 21:28)  HR: 75 (03-26-18 @ 09:18) (73 - 94)  BP: 103/64 (03-26-18 @ 09:18) (96/58 - 123/75)  RR: 17 (03-26-18 @ 09:18) (16 - 17)  SpO2: 97% (03-26-18 @ 09:18) (94% - 100%)  Wt(kg): --     PHYSICAL EXAM:  Gen Appearance: _x__no acute distress _x__appropriate         Neuro: _x__SILT feet__x__ EOM Intact Psych: AAOX_3_, _x__mood/affect appropriate        Eyes: __x_conjunctiva WNL  _____ Pupils equal and round        ENT: _x__ears and nose atraumatic___ Hearing grossly intact        Neck: _x__trachea midline, no visible masses ___thyroid without palpable mass    Resp: __x_Nml WOB____No tactile fremitus ___clear to auscultation    Cardio: __x_extremities free from edema ____pedal pulses palpable    GI/Abdomen: _x__soft ___x__ Nontender______Nondistended_____HSM    Lymphatic: ___no palpable nodes in neck  ___no palpable nodes calves and feet    Skin/Wound: _x__Incision, _x__Dressing c/d/i,   ____surrounding tissues soft,  ___drain/chest tube present____    Muscular: EHL __5_/5  Gastrocnemius_5__/5    x___absent clubbing/cyanosis         ASSESSMENT:    This is a 67y old Female with a history of GERD, HTN, Hypothyroidism, Asthma, Spinal stenosis with chronic back pain s/p PSF L4-S1 on 3/22/18 and her back pain is managed well with current regimen     Recommended Treatment PLAN:  1. Continue current regimen as inpatient  2. D/C home with Percocet 5/325 q4h prn
Spine Note    Patient seen and examined at bedside. She had just returned from doing x-rays. She has some right sided crest soreness. Mild incisional soreness. Pain is well controlled on current regimen. Denies leg tingling, some residual numbness.  She had been able to walk from the bed to the chair and restroom.     Exam: Lumbar prineo dressing intact. Drains in tact. Bilateral lower extremity motor and neuro exam intact. Dp palpable bilaterally.    A/P: POD # 3 s/p L4-S1 PSF with pelvic fixation.   - follow labs  - follow drain outputs  - pain control  - PT  - discharge planning  - Discussed with Dr Isaac
CCM PUD    INTERVAL HPI/OVERNIGHT EVENTS:    PAST MEDICAL & SURGICAL HISTORY:  GERD (gastroesophageal reflux disease)  HTN (hypertension)  Hypothyroidism  Asthma  Spinal stenosis  History of tonsillectomy  History of appendectomy  H/O umbilical hernia repair      FAMILY HISTORY:      SOCIAL HISTORY:    REVIEW OF SYSTEMS:  Constitutional: () weight change, (-) fever,  (-) chills, () fatigue, (-) night sweats  Eyes: () discharge, () eye pain, () visual change  ENT:  () hearing difficulty, () vertigo, () sinus pain,  () throat pain, () epistaxis, () dysphagia, () hoarseness  Neck: () pain, () stiffness, () swelling  Respiratory: (-) cough, () wheezing, () hemoptysis      Cardiovascular: (-) chest pain, ()palpitations, () dizziness   Gastrointestinal: (-) abdominal pain, () nausea, () vomiting, () hematemesis, () diarrhea,  () constipation, () melena  Genitourinary:  (-) dysuria, () frequency, () hematuria, () incontinence      Neurologic: (-) headache, () memory loss, () loss of strength, () numbness, () tremor     Skin: () itching, () burning, () rash, () lesions   Lymphatic: () enlarged lymph nodes  Endocrine: () hair loss, () temperature intolerance         Musculoskeletal: () back pain, () joint pain,  () extremity pain  Psychiatric: () visual change, () auditory change, () depression, () anxiety, () suicidal  Sleep: () disorder, () insomnia, () sleep deprivation  Heme/Lymph: () easy bruising, () bleeding gums            Allergy and Immunologic: () hives, () eczema    Vital Signs Last 24 Hrs  T(C): 37.2 (24 Mar 2018 09:35), Max: 37.4 (23 Mar 2018 21:55)  T(F): 98.9 (24 Mar 2018 09:35), Max: 99.4 (23 Mar 2018 21:55)  HR: 74 (24 Mar 2018 09:35) (69 - 80)  BP: 123/71 (24 Mar 2018 09:35) (113/67 - 123/71)  BP(mean): --  RR: 14 (24 Mar 2018 09:35) (12 - 16)  SpO2: 94% (24 Mar 2018 09:35) (94% - 96%)    I&O's Detail    23 Mar 2018 07:01  -  24 Mar 2018 07:00  --------------------------------------------------------  IN:    IV PiggyBack: 180 mL    lactated ringers.: 1375 mL    Oral Fluid: 800 mL  Total IN: 2355 mL    OUT:    Drain: 125 mL    Drain: 95 mL    Drain: 110 mL    Indwelling Catheter - Urethral: 3150 mL  Total OUT: 3480 mL    Total NET: -1125 mL      24 Mar 2018 07:01  -  24 Mar 2018 14:06  --------------------------------------------------------  IN:  Total IN: 0 mL    OUT:    Drain: 25 mL    Drain: 45 mL    Drain: 30 mL  Total OUT: 100 mL    Total NET: -100 mL    PHYSICAL EXAM:  evaluated several times today  in bed, has been sitting in chair once, not able to walk (dizziness)  Well nourished, well developed, comfortable, - acute distress; vital signs are monitored  Eyes: PERRLA, EOMI, -conjunctivitis, -scleritis   Head: no focal deficit, normocephalic,  no trauma  ENMT: moist tongue, no thrush, -nasal discharge, -hoarseness, normal hearing, -cough, -hemoptysis, trachea midline  Neck: supple, - lymphadenopathy,  -masses, -JVD  Respiratory: bilateral diminished breath sounds, -wheezing, -rhonchi, -rales, -crackles  Chest: -accessory muscle use, -paradoxical breathing  Cardiovascular: regular rate and sinus rhythm, S1 S2 normal, -S3, -S4, -murmur, -gallop, -rub  Gastrointestinal: soft, nontender, nondistended, normal bowel sounds, no hepatosplenomegaly  Genitourinary: -flank pain, -dysuria  Extremities: -clubbing, -cyanosis, -edema   IPCD   Vascular: peripheral pulses palpable 2+ bilaterally  Neurological: alert, oriented x 3, no focal deficit, -tremor   Skin: warm, dry, -erythema, iv site intact  Lymph nodes; no cervical, supraclavicular or axillary adenopathy  Psychiatric: cooperative, appropriate mood    MEDICATIONS  (STANDING):  albumin human  5% IVPB 250 milliLiter(s) IV Intermittent every 2 hours  amLODIPine   Tablet 5 milliGRAM(s) Oral daily  bisoprolol   Tablet 5 milliGRAM(s) Oral daily  BUpivacaine liposome 1.3% Injectable (no eMAR) 20 milliLiter(s) Local Injection once  docusate sodium 100 milliGRAM(s) Oral three times a day  lactated ringers. 1000 milliLiter(s) (125 mL/Hr) IV Continuous <Continuous>  levothyroxine 100 MICROGram(s) Oral daily  loratadine 10 milliGRAM(s) Oral daily  multivitamin 1 Tablet(s) Oral daily    MEDICATIONS  (PRN):  acetaminophen   Tablet 650 milliGRAM(s) Oral every 6 hours PRN For Temp greater than 38 C (100.4 F)  acetaminophen   Tablet. 650 milliGRAM(s) Oral every 6 hours PRN Mild Pain (1 - 3); headache  ALBUTerol    90 MICROgram(s) HFA Inhaler 2 Puff(s) Inhalation every 6 hours PRN Shortness of Breath and/or Wheezing  aluminum hydroxide/magnesium hydroxide/simethicone Suspension 30 milliLiter(s) Oral every 12 hours PRN Indigestion  artificial  tears Solution 1 Drop(s) Both EYES five times a day PRN Dry Eyes  magnesium hydroxide Suspension 30 milliLiter(s) Oral every 12 hours PRN Constipation  metoclopramide Injectable 10 milliGRAM(s) IV Push once PRN Nausea and/or Vomiting  morphine  - Injectable 4 milliGRAM(s) IV Push every 3 hours PRN Severe Pain (7 - 10)  naloxone Injectable 0.1 milliGRAM(s) IV Push every 3 minutes PRN For ANY of the following changes in patient status:  A. RR LESS THAN 10 breaths per minute, B. Oxygen saturation LESS THAN 90%, C. Sedation score of 6  ondansetron Injectable 4 milliGRAM(s) IV Push every 6 hours PRN Nausea  oxyCODONE    IR 5 milliGRAM(s) Oral every 3 hours PRN Mild Pain (1 - 3)  oxyCODONE    IR 10 milliGRAM(s) Oral every 3 hours PRN Moderate Pain (4 - 6)    Allergies    aspirin (Unknown)  codeine (Other)  eggs (Unknown)  Flu Vaccine (Swelling)  Tylenol (Unknown)    Intolerances    LABS:                        9.7    11.0  )-----------( 116      ( 24 Mar 2018 06:54 )             29.6     03-24    134<L>  |  98  |  8   ----------------------------<  115<H>  3.9   |  29  |  0.53    Ca    8.8      24 Mar 2018 06:54    +DVT prophylaxis  IPCD  +Sleep  POOR, SLEEPS DURING THE DAY  +Nutrition goals ORAL  -Pain  CONTROLLED  -Decubital ulcer  +GI prophylaxis (PPV, coagulopathy, Hx)  +Aspiration prophylaxis (45 degrees)  +Sedation/analgesia stopped once  +ID (phos, CH, mupi, SB)  -Delirium  +Cardiac Beta/  +Prevention  +Education  +Medication reviewed (drug-drug interactions, PDA)  Medical devices  3 DRAINS, 2 R and 1 L, MORA, iv  Discussed with Private RN, RN, family    RADIOLOGY & ADDITIONAL STUDIES:    fluoro; reviewedd
CCM PUD    INTERVAL HPI/OVERNIGHT EVENTS:    able to sit up and stand up    PAST MEDICAL & SURGICAL HISTORY:  GERD (gastroesophageal reflux disease)  HTN (hypertension)  Hypothyroidism  Asthma  Spinal stenosis  History of tonsillectomy  History of appendectomy  H/O umbilical hernia repair    FAMILY HISTORY:    SOCIAL HISTORY:    REVIEW OF SYSTEMS:  Constitutional: () weight change, (-) fever,  (-) chills, () fatigue, (-) night sweats  Eyes: (-) discharge, () eye pain, () visual change  ENT:  (-) hearing difficulty, () vertigo, () sinus pain,  () throat pain, () epistaxis, () dysphagia, () hoarseness  Neck: () pain, () stiffness, () swelling  Respiratory: (-) cough, () wheezing, () hemoptysis      Cardiovascular: (-) chest pain, ()palpitations, () dizziness   Gastrointestinal: (-) abdominal pain, () nausea, () vomiting, () hematemesis, () diarrhea,  () constipation, () melena  Genitourinary:  (-) dysuria, () frequency, () hematuria, () incontinence      Neurologic: () headache, () memory loss, () loss of strength, () numbness, () tremor     Skin: (++) itching, () burning, () rash, () lesions   Lymphatic: () enlarged lymph nodes  Endocrine: () hair loss, () temperature intolerance         Musculoskeletal: (+) back pain, () joint pain,  () extremity pain  Psychiatric: () visual change, () auditory change, () depression, () anxiety, () suicidal  Sleep: () disorder, () insomnia, () sleep deprivation  Heme/Lymph: () easy bruising, () bleeding gums            Allergy and Immunologic: () hives, () eczema    Vital Signs Last 24 Hrs  T(C): 36.6 (23 Mar 2018 14:37), Max: 37.1 (22 Mar 2018 22:43)  T(F): 97.9 (23 Mar 2018 14:37), Max: 98.7 (22 Mar 2018 22:43)  HR: 69 (23 Mar 2018 14:37) (65 - 71)  BP: 113/67 (23 Mar 2018 14:37) (113/67 - 126/66)  BP(mean): --  RR: 14 (23 Mar 2018 14:37) (10 - 16)  SpO2: 96% (23 Mar 2018 14:37) (95% - 98%)    ABG - ( 22 Mar 2018 11:47 )  pH: 7.44  /  pCO2: 38    /  pO2: 192   / HCO3: 25    / Base Excess: 1.2   /  SaO2: x         I&O's Detail    22 Mar 2018 07:01  -  23 Mar 2018 07:00  --------------------------------------------------------  IN:    IV PiggyBack: 100 mL    lactated ringers.: 2070 mL    Oral Fluid: 1100 mL  Total IN: 3270 mL    OUT:    Drain: 155 mL    Drain: 30 mL    Drain: 162 mL    Indwelling Catheter - Urethral: 2445 mL  Total OUT: 2792 mL    Total NET: 478 mL      23 Mar 2018 07:01  -  23 Mar 2018 17:58  --------------------------------------------------------  IN:    lactated ringers.: 1000 mL  Total IN: 1000 mL    OUT:    Drain: 40 mL    Drain: 30 mL    Drain: 50 mL  Total OUT: 120 mL    Total NET: 880 mL    PHYSICAL EXAM:  in bed, on PCA  Well nourished, well developed, comfortable, - acute distress; vital signs are monitored continuously  Eyes: PERRLA, EOMI, -conjunctivitis, -scleritis   Head: no focal deficit, normocephalic,  no trauma  ENMT: moist tongue, no thrush, -nasal discharge, -hoarseness, normal hearing, -cough, -hemoptysis, trachea midline  Neck: supple, - lymphadenopathy,  -masses, -JVD  Respiratory: bilateral diminished breath sounds, -wheezing, + rhonchi, -rales, -crackles  Chest: -accessory muscle use, -paradoxical breathing  Cardiovascular: regular rate and sinus rhythm, S1 S2 normal, -S3, -S4, -murmur, -gallop, -rub  Gastrointestinal: soft, nontender, nondistended, normal bowel sounds, no hepatosplenomegaly  Genitourinary: -flank pain, -dysuria  MORA (not able to use commode)  Extremities: -clubbing, -cyanosis, -edema    Vascular: peripheral pulses palpable 2+ bilaterally  Neurological: alert, oriented x 3, no focal deficit, -tremor   Skin: warm, dry, -erythema, iv sites x 2 intact  Lymph nodes; no cervical, supraclavicular or axillary adenopathy  Psychiatric: cooperative, appropriate mood    MEDICATIONS  (STANDING):  albumin human  5% IVPB 250 milliLiter(s) IV Intermittent every 2 hours  amLODIPine   Tablet 5 milliGRAM(s) Oral daily  bisoprolol   Tablet 5 milliGRAM(s) Oral daily  BUpivacaine liposome 1.3% Injectable (no eMAR) 20 milliLiter(s) Local Injection once  docusate sodium 100 milliGRAM(s) Oral three times a day  HYDROmorphone PCA (1 mG/mL) 30 milliLiter(s) PCA Continuous PCA Continuous  lactated ringers. 1000 milliLiter(s) (125 mL/Hr) IV Continuous <Continuous>  levothyroxine 100 MICROGram(s) Oral daily  loratadine 10 milliGRAM(s) Oral daily  multivitamin 1 Tablet(s) Oral daily    MEDICATIONS  (PRN):  ALBUTerol    90 MICROgram(s) HFA Inhaler 2 Puff(s) Inhalation every 6 hours PRN Shortness of Breath and/or Wheezing  aluminum hydroxide/magnesium hydroxide/simethicone Suspension 30 milliLiter(s) Oral every 12 hours PRN Indigestion  artificial  tears Solution 1 Drop(s) Both EYES five times a day PRN Dry Eyes  HYDROmorphone PCA (1 mG/mL) Rescue Clinician Bolus 0.5 milliGRAM(s) IV Push every 2 hours PRN Severe Pain (7 - 10)  magnesium hydroxide Suspension 30 milliLiter(s) Oral every 12 hours PRN Constipation  metoclopramide Injectable 10 milliGRAM(s) IV Push once PRN Nausea and/or Vomiting  naloxone Injectable 0.1 milliGRAM(s) IV Push every 3 minutes PRN For ANY of the following changes in patient status:  A. RR LESS THAN 10 breaths per minute, B. Oxygen saturation LESS THAN 90%, C. Sedation score of 6  ondansetron Injectable 4 milliGRAM(s) IV Push every 6 hours PRN Nausea    Allergies    aspirin (Unknown)  codeine (Other)  eggs (Unknown)  Flu Vaccine (Swelling)  Tylenol (Unknown)    Intolerances    LABS:                        9.9    9.7   )-----------( 137      ( 23 Mar 2018 06:51 )             30.5     03-23    139  |  102  |  11  ----------------------------<  117<H>  4.5   |  29  |  0.52    Ca    8.7      23 Mar 2018 06:51  PT/INR - ( 22 Mar 2018 07:03 )   PT: 12.2 sec;   INR: 1.10     PTT - ( 22 Mar 2018 07:03 )  PTT:30.9 sec    Urinalysis Basic - ( 22 Mar 2018 08:16 )    Color: Yellow / Appearance: Clear / SG: >=1.030 / pH: x  Gluc: x / Ketone: NEGATIVE  / Bili: Negative / Urobili: 0.2 E.U./dL   Blood: x / Protein: NEGATIVE mg/dL / Nitrite: NEGATIVE   Leuk Esterase: Trace / RBC: < 5 /HPF / WBC 5-10 /HPF   Sq Epi: x / Non Sq Epi: 0-5 /HPF / Bacteria: Present /HPF    +DVT prophylaxis  IPCD  +Sleep PCA  +Nutrition goals  ORAL   -Pain PCA  -Decubital ulcer  +GI prophylaxis (PPV, coagulopathy, Hx)  +Aspiration prophylaxis (45 degrees)  +Sedation/analgesia stopped once  +ID (phos, CH, mupi, SB)  -Delirium  +Cardiac   +Prevention  +Education  +Medication reviewed (drug-drug interactions, PDA)  Medical devices  Discussed with PGY, CCRN, children at bedside    RADIOLOGY & ADDITIONAL STUDIES:
CCM PUD IM    INTERVAL HPI/OVERNIGHT EVENTS:    able to walk more than 10 m, with one assist and 4 point walker    PAST MEDICAL & SURGICAL HISTORY:  GERD (gastroesophageal reflux disease)  HTN (hypertension)  Hypothyroidism  Asthma  Spinal stenosis  History of tonsillectomy  History of appendectomy  H/O umbilical hernia repair    FAMILY HISTORY:    SOCIAL HISTORY:    REVIEW OF SYSTEMS:  Constitutional: () weight change, (-) fever,  (-) chills, () fatigue, (-) night sweats  Eyes: (-) discharge, () eye pain, () visual change  ENT:  (-) hearing difficulty, () vertigo, () sinus pain,  () throat pain, () epistaxis, () dysphagia, () hoarseness  Neck: (-) pain, () stiffness, () swelling  Respiratory: (-) cough, () wheezing, () hemoptysis      Cardiovascular: (-) chest pain, ()palpitations, (+) dizziness   Gastrointestinal: (+) abdominal pain, () nausea, () vomiting, () hematemesis, () diarrhea,  (+) constipation, () melena  Genitourinary:  (+) dysuria, () frequency, () hematuria, () incontinence      Neurologic: (-) headache, () memory loss, () loss of strength, () numbness, () tremor     Skin: () itching, () burning, () rash, () lesions   Lymphatic: () enlarged lymph nodes  Endocrine: () hair loss, () temperature intolerance         Musculoskeletal: (+) back pain, () joint pain,  () extremity pain  Psychiatric: () visual change, () auditory change, () depression, () anxiety, () suicidal  Sleep: (-) disorder, () insomnia, () sleep deprivation  Heme/Lymph: () easy bruising, () bleeding gums            Allergy and Immunologic: () hives, () eczema    Vital Signs Last 24 Hrs  T(C): 37.3 (25 Mar 2018 09:31), Max: 37.6 (24 Mar 2018 18:30)  T(F): 99.2 (25 Mar 2018 09:31), Max: 99.7 (24 Mar 2018 18:30)  HR: 75 (25 Mar 2018 12:30) (74 - 94)  BP: 115/69 (25 Mar 2018 12:30) (96/58 - 131/78)  BP(mean): --  RR: 15 (25 Mar 2018 09:31) (15 - 22)  SpO2: 95% (25 Mar 2018 12:30) (94% - 100%)    I&O's Detail    24 Mar 2018 07:01  -  25 Mar 2018 07:00  --------------------------------------------------------  IN:    Oral Fluid: 640 mL    Sodium Chloride 0.9% IV Bolus: 1000 mL  Total IN: 1640 mL    OUT:    Drain: 85 mL    Drain: 50 mL    Drain: 60 mL    Indwelling Catheter - Urethral: 2650 mL  Total OUT: 2845 mL    Total NET: -1205 mL      25 Mar 2018 07:01  -  25 Mar 2018 16:44  --------------------------------------------------------  IN:  Total IN: 0 mL    OUT:    Drain: 10 mL    Indwelling Catheter - Urethral: 1050 mL  Total OUT: 1060 mL    Total NET: -1060 mL    PHYSICAL EXAM:  in bed, private duty nurse  Well nourished, well developed, comfortable, - acute distress; vital signs are monitored  Eyes: PERRLA, EOMI, -conjunctivitis, -scleritis   Head: no focal deficit, normocephalic,  no trauma  ENMT: moist tongue, no thrush, -nasal discharge, -hoarseness, normal hearing, -cough, -hemoptysis, trachea midline  Neck: supple, - lymphadenopathy,  -masses, -JVD  Respiratory: bilateral diminished breath sounds, -wheezing, -rhonchi, -rales, -crackles  Chest: -accessory muscle use, -paradoxical breathing  Cardiovascular: regular rate and sinus rhythm, S1 S2 normal, -S3, -S4, -murmur, -gallop, -rub  Gastrointestinal: soft, mildly tender, nondistended, normal bowel sounds, no hepatosplenomegaly, passing gas  Genitourinary: -flank pain, -dysuria  Extremities: -clubbing, -cyanosis, -edema    Vascular: peripheral pulses palpable 2+ bilaterally  Neurological: alert, oriented x 3, no focal deficit, -tremor   Skin: warm, dry, -erythema, iv site intact  Lymph nodes; no cervical, supraclavicular or axillary adenopathy  Psychiatric: cooperative, appropriate mood    MEDICATIONS  (STANDING):  albumin human  5% IVPB 250 milliLiter(s) IV Intermittent every 2 hours  amLODIPine   Tablet 5 milliGRAM(s) Oral daily  bisoprolol   Tablet 5 milliGRAM(s) Oral daily  BUpivacaine liposome 1.3% Injectable (no eMAR) 20 milliLiter(s) Local Injection once  docusate sodium 100 milliGRAM(s) Oral three times a day  lactated ringers. 1000 milliLiter(s) (125 mL/Hr) IV Continuous <Continuous>  levothyroxine 100 MICROGram(s) Oral daily  loratadine 10 milliGRAM(s) Oral daily  multivitamin 1 Tablet(s) Oral daily    MEDICATIONS  (PRN):  acetaminophen   Tablet 650 milliGRAM(s) Oral every 6 hours PRN For Temp greater than 38 C (100.4 F)  acetaminophen   Tablet. 650 milliGRAM(s) Oral every 6 hours PRN Mild Pain (1 - 3); headache  ALBUTerol    90 MICROgram(s) HFA Inhaler 2 Puff(s) Inhalation every 6 hours PRN Shortness of Breath and/or Wheezing  aluminum hydroxide/magnesium hydroxide/simethicone Suspension 30 milliLiter(s) Oral every 12 hours PRN Indigestion  artificial  tears Solution 1 Drop(s) Both EYES five times a day PRN Dry Eyes  bisacodyl 5 milliGRAM(s) Oral every 12 hours PRN Constipation  magnesium hydroxide Suspension 30 milliLiter(s) Oral every 12 hours PRN Constipation  metoclopramide Injectable 10 milliGRAM(s) IV Push once PRN Nausea and/or Vomiting  morphine  - Injectable 4 milliGRAM(s) IV Push every 3 hours PRN Severe Pain (7 - 10)  naloxone Injectable 0.1 milliGRAM(s) IV Push every 3 minutes PRN For ANY of the following changes in patient status:  A. RR LESS THAN 10 breaths per minute, B. Oxygen saturation LESS THAN 90%, C. Sedation score of 6  ondansetron Injectable 4 milliGRAM(s) IV Push every 6 hours PRN Nausea  oxyCODONE    IR 5 milliGRAM(s) Oral every 3 hours PRN Mild Pain (1 - 3)  oxyCODONE    IR 10 milliGRAM(s) Oral every 3 hours PRN Moderate Pain (4 - 6)    Allergies    aspirin (Unknown)  codeine (Other)  eggs (Unknown)  Flu Vaccine (Swelling)  Tylenol (Unknown)    Intolerances    LABS:                        9.2    8.0   )-----------( 110      ( 25 Mar 2018 06:17 )             28.5     03-25    138  |  101  |  8   ----------------------------<  92  3.6   |  31  |  0.53    Ca    8.6      25 Mar 2018 06:17    +DVT prophylaxis  IPCD  +Sleep  oK  +Nutrition goals  ORAL  -Pain  DENIED  -Decubital ulcer  +GI prophylaxis (PPV, coagulopathy, Hx)  +Aspiration prophylaxis (45 degrees)  +Sedation/analgesia stopped once  +ID (phos, CH, mupi, SB)  -Delirium  +Cardiac BB  +Prevention  +Education  +Medication reviewed (drug-drug interactions, PDA)  Medical devices  Discussed with RN, RN, family    RADIOLOGY & ADDITIONAL STUDIES:    XRs: reviewed
Did well o/n.     Procedure: PSF L4-S1, ICBG  Surgeon: Dr. Meadows    Pt comfortable without complaints, pain controlled  Denies CP, SOB, N/V, numbness/tingling     Vital Signs Last 24 Hrs  T(C): 36.4 (23 Mar 2018 08:48), Max: 37.1 (22 Mar 2018 22:43)  T(F): 97.5 (23 Mar 2018 08:48), Max: 98.7 (22 Mar 2018 22:43)  HR: 71 (23 Mar 2018 08:48) (58 - 71)  BP: 126/66 (23 Mar 2018 08:48) (99/54 - 126/66)  BP(mean): 94 (22 Mar 2018 17:04) (69 - 94)  RR: 12 (23 Mar 2018 08:48) (8 - 22)  SpO2: 97% (23 Mar 2018 08:48) (95% - 100%)    General: Pt Alert and oriented, NAD  DSG C/D/I back, HVx2  wwp  SILT  Motor: EHL/FHL/TA/GS 5/5 b/l                A/P: 67yFemale POD#1 s/p PSF L4-S1  - Stable  - Pain Control  - DVT ppx: scds  - Post op abx: ancef  - PT, WBS: wbat    Ortho Pager [phone]
[facility] PUD ATTENDING    INTERVAL HPI/OVERNIGHT EVENTS:    67 year old [place] woman who had high complexity two level neurosurgery L4 and L5 by Dr Isaac today.  She is now in the PACU, still sedated    PAST MEDICAL & SURGICAL HISTORY:  GERD (gastroesophageal reflux disease)  HTN (hypertension)  Hypothyroidism  Asthma  Spinal stenosis  History of tonsillectomy  History of appendectomy  H/O umbilical hernia repair    FAMILY HISTORY:    SOCIAL HISTORY:    REVIEW OF SYSTEMS:  still sedated  Constitutional: () weight change, () fever,  () chills, () fatigue, () night sweats  Eyes: () discharge, () eye pain, () visual change  ENT:  () hearing difficulty, () vertigo, () sinus pain,  () throat pain, () epistaxis, () dysphagia, () hoarseness  Neck: () pain, () stiffness, () swelling  Respiratory: () cough, () wheezing, () hemoptysis      Cardiovascular: () chest pain, ()palpitations, () dizziness   Gastrointestinal: () abdominal pain, () nausea, () vomiting, () hematemesis, () diarrhea,  () constipation, () melena  Genitourinary:  () dysuria, () frequency, () hematuria, () incontinence      Neurologic: () headache, () memory loss, () loss of strength, () numbness, () tremor     Skin: () itching, () burning, () rash, () lesions   Lymphatic: () enlarged lymph nodes  Endocrine: () hair loss, () temperature intolerance         Musculoskeletal: () back pain, () joint pain,  () extremity pain  Psychiatric: () visual change, () auditory change, () depression, () anxiety, () suicidal  Sleep: () disorder, () insomnia, () sleep deprivation  Heme/Lymph: () easy bruising, () bleeding gums            Allergy and Immunologic: () hives, () eczema    Vital Signs Last 24 Hrs  T(C): --shivering  T(F): --   HR: --  63  BP: -- 150/54  BP(mean): --82  RR: -- 0-9  SpO2: --99%  ET CO2 19-41  CONTINUOUS ENDTIDAL    ABG - ( 22 Mar 2018 11:47 )  pH: 7.44  /  pCO2: 38    /  pO2: 192   / HCO3: 25    / Base Excess: 1.2   /  SaO2: x         I&O's Detail    PHYSICAL EXAM:  in bed, sedated  on monitor, shivering  3 Prasanna-Jose Angel  Well nourished, well developed, comfortable, - acute distress; vital signs are monitored continuously  Eyes: PERRLA, -conjunctivitis, -scleritis   Head: no focal deficit, normocephalic,  no trauma  ENMT: moist tongue, no thrush, -nasal discharge, -hoarseness, normal hearing, -cough, -hemoptysis, trachea midline  Neck: supple, - lymphadenopathy,  -masses, -JVD  Respiratory: bilateral diminished breath sounds, -wheezing, -rhonchi, -rales, -crackles  Chest: -accessory muscle use, -paradoxical breathing  Cardiovascular: regular rate and sinus rhythm, S1 S2 normal, -S3, -S4, -murmur, -gallop, -rub  Gastrointestinal: soft, nontender, nondistended, normal bowel sounds, no hepatosplenomegaly  Genitourinary: -flank pain, -dysuria  MORA  Extremities: -clubbing, -cyanosis, -edema   2 IVs, A line  Vascular: peripheral pulses palpable 2+ bilaterally  Neurological: sedated, awakening, no focal deficit, + tremor   Skin: warm, dry, -erythema, iv sites x2 intact  Lymph nodes; no cervical, supraclavicular or axillary adenopathy  Psychiatric: sedated    MEDICATIONS  (STANDING):  albumin human  5% IVPB 250 milliLiter(s) IV Intermittent every 2 hours  bisoprolol   Tablet 5 milliGRAM(s) Oral daily  BUpivacaine liposome 1.3% Injectable (no eMAR) 20 milliLiter(s) Local Injection once  HYDROmorphone PCA (1 mG/mL) 30 milliLiter(s) PCA Continuous PCA Continuous    MEDICATIONS  (PRN):  HYDROmorphone PCA (1 mG/mL) Rescue Clinician Bolus 0.5 milliGRAM(s) IV Push every 2 hours PRN Severe Pain (7 - 10)  naloxone Injectable 0.1 milliGRAM(s) IV Push every 3 minutes PRN For ANY of the following changes in patient status:  A. RR LESS THAN 10 breaths per minute, B. Oxygen saturation LESS THAN 90%, C. Sedation score of 6  ondansetron Injectable 4 milliGRAM(s) IV Push every 6 hours PRN Nausea    Allergies    aspirin (Unknown)  codeine (Other)  eggs (Unknown)  Flu Vaccine (Swelling)  Tylenol (Unknown)    Intolerances    LABS:                        9.7    4.4   )-----------( 148      ( 22 Mar 2018 11:51 )             29.9     PT/INR - ( 22 Mar 2018 07:03 )   PT: 12.2 sec;   INR: 1.10     PTT - ( 22 Mar 2018 07:03 )  PTT:30.9 sec  Urinalysis Basic - ( 22 Mar 2018 08:16 )    Color: Yellow / Appearance: Clear / SG: >=1.030 / pH: x  Gluc: x / Ketone: NEGATIVE  / Bili: Negative / Urobili: 0.2 E.U./dL   Blood: x / Protein: NEGATIVE mg/dL / Nitrite: NEGATIVE   Leuk Esterase: Trace / RBC: < 5 /HPF / WBC 5-10 /HPF   Sq Epi: x / Non Sq Epi: 0-5 /HPF / Bacteria: Present /HPF    +DVT prophylaxis  IPCD  +Sleep  +Nutrition goals NPO  -Pain  NOT OBVIOUS, HAD HYDROMORPHONE, SEVOFLURANE  -Decubital ulcer  +GI prophylaxis (PPV, coagulopathy, Hx)  +Aspiration prophylaxis (45 degrees)  +Sedation/analgesia stopped  +ID (phos, CH, mupi, SB)  -Delirium  +Cardiac Beta/  +Prevention  +Education  +Medication reviewed (drug-drug interactions, PDA)  Medical devices  Discussed with PACU, PGY, CCRNs, professional support, Dr Isaac  RADIOLOGY & ADDITIONAL STUDIES:  XR reviewed: 2 level, 4 symmetrical screws
ORTHO NOTE    [x ] Pt seen/examined.  [x ] Pt without any complaints/in NAD. Feels better today.    [ ] Pt complains of:      ROS: [ ] Fever  [ ] Chills  [ ] CP [ ] SOB [ ] Dysnea  [ ] Palpitations [ ] Cough [ ] N/V/C/D [ ] Paresthia [ ] Other     [ ] ROS  otherwise negative    .    PHYSICAL EXAM:    Vital Signs Last 24 Hrs  T(C): 36.4 (23 Mar 2018 08:48), Max: 37.1 (22 Mar 2018 22:43)  T(F): 97.5 (23 Mar 2018 08:48), Max: 98.7 (22 Mar 2018 22:43)  HR: 71 (23 Mar 2018 08:48) (58 - 71)  BP: 126/66 (23 Mar 2018 08:48) (99/54 - 126/66)  BP(mean): 94 (22 Mar 2018 17:04) (69 - 94)  RR: 12 (23 Mar 2018 08:48) (8 - 22)  SpO2: 97% (23 Mar 2018 08:48) (95% - 100%)    I&O's Detail    22 Mar 2018 07:01  -  23 Mar 2018 07:00  --------------------------------------------------------  IN:    IV PiggyBack: 100 mL    lactated ringers.: 2070 mL    Oral Fluid: 1100 mL  Total IN: 3270 mL    OUT:    Drain: 155 mL    Drain: 30 mL    Drain: 162 mL    Indwelling Catheter - Urethral: 2445 mL  Total OUT: 2792 mL    Total NET: 478 mL      23 Mar 2018 07:01  -  23 Mar 2018 12:02  --------------------------------------------------------  IN:    lactated ringers.: 375 mL  Total IN: 375 mL    OUT:  Total OUT: 0 mL    Total NET: 375 mL           CAPILLARY BLOOD GLUCOSE                      Neuro: NAD AOx3    Lungs:    CV:    ABD: Soft NT ND  +malagon    Ext: Prineo dressing CDI  HV x3  5/5 Q FHL EHL GS TA  SILT L2-S1, WWP B/L LE    LABS   23 Mar 2018 06:51    139    |  102    |  11     ----------------------------<  117    4.5     |  29     |  0.52     Ca    8.7        23 Mar 2018 06:51                                   9.9    9.7   )-----------( 137      ( 23 Mar 2018 06:51 )             30.5             PT/INR - ( 22 Mar 2018 07:03 )   PT: 12.2 sec;   INR: 1.10          PTT - ( 22 Mar 2018 07:03 )  PTT:30.9 sec    [ ] Other Labs  [ ] None ordered            Please check or [place] when present:  •  Heart Failure:    [ ] Acute        [ ]  Acute on Chronic        [ ] Chronic         [ ] Diastolic     [ ]  Combined    •  PABLO:     [ ] ATN        [ ]  Renal medullary necrosis       [ ]  Renal cortical necrosis                  [ ] Other pathological Lesion:  •  CKD:  [ ] Stage I   [ ] Stage II  [ ] Stage III    [ ]Stage IV   [ ]  CKD V   [ ]  Other/Unspecified:    •  Abdominal Nutritional Status:   [ ] Malnutrition-See Nutrition note    [ ] Cachexia   [ ]  Other        [ ] Supplement ordered:            [ ] Morbid Obesity: BMI >=40         ASSESSMENT/PLAN:      STATUS POST: PSF L4-S1, pelvic fixation, ICBG. POD 1  HV x3 remain  Malagon to remain until patient ambulatory.  Post op XR POD 2/3.    CONTINUE:          [ ] PT WBAT    [ ] DVT PPX-SCD    [ ] Pain Mgt-PM SVC    [ ] Dispo plan-Home pending PT progress.
Ortho Post Op Check    Procedure: PSF L4-S1, ICBG  Surgeon: Dr. Meadows    Pt comfortable without complaints, pain controlled  Denies CP, SOB, N/V, numbness/tingling     Vital Signs Last 24 Hrs  AVSS    General: Pt Alert and oriented, NAD  DSG C/D/I back, drain, malagon  Pulses intact b/l LE  Sensation intact b/l LE  Motor: EHL/FHL/TA/GS 5/5 b/l                          9.7    4.4   )-----------( 148      ( 22 Mar 2018 11:51 )             29.9       A/P: 67yFemale POD#0 s/p PSF L4-S1  - Stable  - Pain Control  - DVT ppx: scds  - Post op abx: ancef  - PT, WBS: wbat    Ortho Pager [phone]
Pain Management Progress Note - [facility] [phone]    HPI: Patient seen at 8:30am. NAD. States pain is well controlled with PCA. She states she wants to wait to start oral pain medication until she is able to comfortably tolerate solid food.      Pertinent PMH: Pain at: __x_Back ___Neck___Knee ___Hip ___Shoulder ___ Opioid tolerance    Pain is _x__ sharp ____dull ___burning _x__achy ___ Intensity: ____ mild ___x_mod ____severe     Location ____x_surgical site _____cervical ___x__lumbar ____abd _____upper ext__x__lower ext    Worse with __x__activity __x__movement _____physical therapy___ Rest    Improved with __x__medication ___x_rest ____physical therapy    influenza   Vaccine  BUpivacaine liposome 1.3% Injectable (no eMAR)  lactated ringers.  ceFAZolin   IVPB  aluminum hydroxide/magnesium hydroxide/simethicone Suspension  metoclopramide Injectable  docusate sodium  magnesium hydroxide Suspension  multivitamin  amLODIPine   Tablet  levothyroxine  ALBUTerol    90 MICROgram(s) HFA Inhaler  bisoprolol   Tablet  HYDROmorphone PCA (1 mG/mL)  HYDROmorphone PCA (1 mG/mL) Rescue Clinician Bolus  naloxone Injectable  ondansetron Injectable  artificial  tears Solution      ROS: Const:  _-__febrile   Eyes:___ENT:___CV: _-__chest pain  Resp: _-___sob  GI:___nausea ___vomiting ____abd pain ___npo ___clears ___full diet __bm  :___ Musk: _x__pain ___spasm  Skin:___ Neuro:  ___sedation___confusion____ numbness ___weakness ___paresthesia  Psych:___anxiety  Endo:___ Heme:___Allergy:___  __x__ all other systems reviewed and negative     03-23 @ 06:86973 mL/min/1.73M2      03-22 @ 15:22500 mL/min/1.73M2      Hemoglobin: 9.9 g/dL (03-23 @ 06:51)  Hemoglobin: 11.3 g/dL (03-22 @ 15:17)  Hemoglobin: 9.7 g/dL (03-22 @ 11:51)    T(C): 36.4 (03-23-18 @ 08:48), Max: 37.1 (03-22-18 @ 22:43)  HR: 71 (03-23-18 @ 08:48) (58 - 71)  BP: 126/66 (03-23-18 @ 08:48) (99/54 - 126/66)  RR: 12 (03-23-18 @ 08:48) (8 - 22)  SpO2: 97% (03-23-18 @ 08:48) (95% - 100%)  Wt(kg): --     PHYSICAL EXAM:  Gen Appearance: __x_no acute distress __x_appropriate         Neuro: __x_SILT feet____ EOM Intact Psych: AAOX_3_, __x_mood/affect appropriate        Eyes: _x__conjunctiva WNL  _____ Pupils equal and round        ENT: __x_ears and nose atraumatic__x_ Hearing grossly intact        Neck: _x__trachea midline, no visible masses ___thyroid without palpable mass    Resp: _x__Nml WOB____No tactile fremitus ___clear to auscultation    Cardio: x___extremities free from edema __x__pedal pulses palpable    GI/Abdomen: __x_soft ___x__ Nontender____x__Nondistended_____HSM    Lymphatic: _x__no palpable nodes in neck  ___no palpable nodes calves and feet    Skin/Wound: ___Incision, _x__Dressing c/d/i,   _x___surrounding tissues soft,  ___drain/chest tube present____    Muscular: EHL __5_/5  Gastrocnemius_5__/5    __x_absent clubbing/cyanosis         ASSESSMENT:  This is a 67y old Female with a history of:  M48.06  No h/o HF  Handoff  MEWS Score  GERD (gastroesophageal reflux disease)  HTN (hypertension)  Hypothyroidism  Asthma  Spinal stenosis  Spinal stenosis  Shivering  Anemia  GERD (gastroesophageal reflux disease)  HTN (hypertension)  Hypothyroidism  Asthma  Spinal stenosis  History of tonsillectomy  History of appendectomy  H/O umbilical hernia repair        Recommended Treatment PLAN:    1. Continue Dilaudid PCA until tomorrow as patient is tolerating and responding well.  2. d/c PCA Saturday morning and start patient on oral regimen of Dilaudid 2 - 4mg PO q4 PRN moderate to severe pain, with Dilaudid 0.5mg IV q2 PRN for breakthrough pain  Plan discussed with Dr. Celaya
Patient seen and examined at bedside.     SUBJECTIVE: No acute events overnight.  Pain tolerable.    Denies Chest Pain/SOB.    Denies Headache.    Denies Nausea/vomiting.      OBJECTIVE:   T(C): 36.3 (03-26-18 @ 05:00), Max: 37.4 (03-25-18 @ 21:28)  T(F): 97.3 (03-26-18 @ 05:00), Max: 99.4 (03-25-18 @ 21:28)  HR: 74 (03-26-18 @ 05:00) (73 - 94)  BP: 123/75 (03-26-18 @ 05:00) (96/58 - 123/75)  RR:  (15 - 16)  SpO2:  (94% - 100%)  Wt(kg): --    NAD, non labored respirations  Affected extremity:          Dressing: clean/dry/intact          Drains: none         Sensation: [x ] intact to light touch  [ ] decreased                              Vascular: [x ] warm well perfused; capillary refill <3 seconds          Motor exam: [x ]         [ ] Upper extremity                   Becca (c5)   Bi(c5/6)   WE(c6)   EE(c7)    (c8)                                                R           5              5            5             5             5                                               L            5              5            5             5            5         [x ] Lower extremity                   IP(L2)     Q(L3)     TA(L4)     EHL(L5)     GS(s1)                                                 R          5           5          5            5              5                                               L           5           5         5             5              5                                     9.2<L>  8.0   )-------------------( 110<L>    ( 03-25 @ 06:17 )                 28.5<L>    I&O's Detail    24 Mar 2018 07:01  -  25 Mar 2018 07:00  --------------------------------------------------------  IN:    Oral Fluid: 640 mL    Sodium Chloride 0.9% IV Bolus: 1000 mL  Total IN: 1640 mL    OUT:    Drain: 85 mL    Drain: 50 mL    Drain: 60 mL    Indwelling Catheter - Urethral: 2650 mL  Total OUT: 2845 mL    Total NET: -1205 mL      25 Mar 2018 07:01  -  26 Mar 2018 06:20  --------------------------------------------------------  IN:    Oral Fluid: 240 mL  Total IN: 240 mL    OUT:    Drain: 10 mL    Indwelling Catheter - Urethral: 1050 mL    Voided: 850 mL  Total OUT: 1910 mL    Total NET: -1670 mL          A/P :  67y Female s/p Lumbar Laminectomy/PSF L4-S1      -    Pain control + bowel regimen  -    DVT ppx: SCDs    -    Periop abx    -    ADAT  -    Check AM labs  -    Weight bearing status:   WBAT        -    Physical Therapy  -    Resume home meds  -    Dispo planning
Patient seen and examined at bedside.     SUBJECTIVE: No acute events overnight.  Pain tolerable.    Denies Chest Pain/SOB.    Denies Headache.    Denies Nausea/vomiting.      OBJECTIVE:   T(C): 37.2 (03-24-18 @ 09:35), Max: 37.4 (03-23-18 @ 21:55)  T(F): 98.9 (03-24-18 @ 09:35), Max: 99.4 (03-23-18 @ 21:55)  HR: 74 (03-24-18 @ 09:35) (69 - 80)  BP: 123/71 (03-24-18 @ 09:35) (113/67 - 123/71)  RR:  (12 - 16)  SpO2:  (94% - 96%)  Wt(kg): --    NAD, non labored respirations  Affected extremity:          Dressing: clean/dry/intact          Drains: 3         Sensation: [x ] intact to light touch  [ ] decreased                              Vascular: [x ] warm well perfused; capillary refill <3 seconds          Motor exam: [x ]         [ ] Upper extremity                   Becca (c5)   Bi(c5/6)   WE(c6)   EE(c7)    (c8)                                                R           5              5            5             5             5                                               L            5              5            5             5            5         [x ] Lower extremity                   IP(L2)     Q(L3)     TA(L4)     EHL(L5)     GS(s1)                                                 R          5           5          5            5              5                                               L           5           5         5             5              5                                     9.7<L>  11.0<H> )-------------------( 116<L>    ( 03-24 @ 06:54 )                 29.6<L>    I&O's Detail    23 Mar 2018 07:01  -  24 Mar 2018 07:00  --------------------------------------------------------  IN:    IV PiggyBack: 180 mL    lactated ringers.: 1375 mL    Oral Fluid: 800 mL  Total IN: 2355 mL    OUT:    Drain: 125 mL    Drain: 95 mL    Drain: 110 mL    Indwelling Catheter - Urethral: 3150 mL  Total OUT: 3480 mL    Total NET: -1125 mL      24 Mar 2018 07:01  -  24 Mar 2018 14:11  --------------------------------------------------------  IN:  Total IN: 0 mL    OUT:    Drain: 25 mL    Drain: 45 mL    Drain: 30 mL  Total OUT: 100 mL    Total NET: -100 mL          A/P :  67y Female s/p Lumbar Laminectomy/PSF L4-S1      -    Pain control + bowel regimen  -    DVT ppx: SCDs    -    Periop abx    -    ADAT  -    Check AM labs  -    Weight bearing status:   WBAT        -    Physical Therapy  -    Resume home meds  -    Dispo planning
Patient seen and examined at bedside.     SUBJECTIVE: No acute events overnight.  Pain tolerable.    Denies Chest Pain/SOB.    Denies Headache.    Denies Nausea/vomiting.      OBJECTIVE:   T(C): 37.4 (03-25-18 @ 04:48), Max: 37.6 (03-24-18 @ 18:30)  T(F): 99.4 (03-25-18 @ 04:48), Max: 99.7 (03-24-18 @ 18:30)  HR: 78 (03-25-18 @ 04:48) (74 - 78)  BP: 131/78 (03-25-18 @ 04:48) (116/70 - 131/78)  RR:  (14 - 22)  SpO2:  (94% - 95%)  Wt(kg): --    NAD, non labored respirations  Affected extremity:          Dressing: clean/dry/intact          Drains: 3         Sensation: [ x] intact to light touch  [ ] decreased                              Vascular: [x ] warm well perfused; capillary refill <3 seconds          Motor exam: [x ]         [ ] Upper extremity                   Becca (c5)   Bi(c5/6)   WE(c6)   EE(c7)    (c8)                                                R           5              5            5             5             5                                               L            5              5            5             5            5         [x ] Lower extremity                   IP(L2)     Q(L3)     TA(L4)     EHL(L5)     GS(s1)                                                 R          5           5          5            5              5                                               L           5           5         5             5              5                                     9.2<L>  8.0   )-------------------( 110<L>    ( 03-25 @ 06:17 )                 28.5<L>    I&O's Detail    24 Mar 2018 07:01  -  25 Mar 2018 07:00  --------------------------------------------------------  IN:    Oral Fluid: 640 mL    Sodium Chloride 0.9% IV Bolus: 1000 mL  Total IN: 1640 mL    OUT:    Drain: 85 mL    Drain: 50 mL    Drain: 60 mL    Indwelling Catheter - Urethral: 2650 mL  Total OUT: 2845 mL    Total NET: -1205 mL          A/P :  67y Female s/p Lumbar Laminectomy/PSF L4-S1     -    Pain control + bowel regimen  -    DVT ppx: SCDs    -    Periop abx    -    ADAT  -    Check AM labs  -    Weight bearing status:   WBAT        -    Physical Therapy  -    Resume home meds  -    Dispo planning
Spine Post Op Note    Patient seen and examined at bedside. She is resting comfortably. She has some lumbar incisional soreness. Denies leg pain or tingling. Pain is well controlled on current regimen. No acute complaints.    Exam: Lumbar Prineo dressing intact. Drains in place. Bilateral lower extremity strength 5/5 and sensation grossly intact. DP are palpable bilaterally.     A/P: POD # 2 s/p L4-S1 PSF with pelvic fixation.  - follow labs  - follow drain outputs  - lumbar xrays  - PT   - pain control   -Discussed with Dr Isaac
[facility] PUD    INTERVAL HPI/OVERNIGHT EVENTS:    all new data labs, VS. Rx    PAST MEDICAL & SURGICAL HISTORY:  GERD (gastroesophageal reflux disease)  HTN (hypertension)  Hypothyroidism  Asthma  Spinal stenosis  History of tonsillectomy  History of appendectomy  H/O umbilical hernia repair    FAMILY HISTORY:    SOCIAL HISTORY:    REVIEW OF SYSTEMS:  no significant change  Constitutional: () weight change, () fever,  () chills, () fatigue, () night sweats  Eyes: () discharge, () eye pain, () visual change  ENT:  () hearing difficulty, () vertigo, () sinus pain,  () throat pain, () epistaxis, () dysphagia, () hoarseness  Neck: () pain, () stiffness, () swelling  Respiratory: () cough, () wheezing, () hemoptysis      Cardiovascular: () chest pain, ()palpitations, () dizziness   Gastrointestinal: () abdominal pain, () nausea, () vomiting, () hematemesis, () diarrhea,  () constipation, () melena  Genitourinary:  () dysuria, () frequency, () hematuria, () incontinence      Neurologic: () headache, () memory loss, () loss of strength, () numbness, () tremor     Skin: () itching, () burning, () rash, () lesions   Lymphatic: () enlarged lymph nodes  Endocrine: () hair loss, () temperature intolerance         Musculoskeletal: () back pain, () joint pain,  () extremity pain  Psychiatric: () visual change, () auditory change, () depression, () anxiety, () suicidal  Sleep: () disorder, () insomnia, () sleep deprivation  Heme/Lymph: () easy bruising, () bleeding gums            Allergy and Immunologic: () hives, () eczema    Vital Signs Last 24 Hrs  T(C): 36.8 (26 Mar 2018 13:59), Max: 37.4 (25 Mar 2018 21:28)  T(F): 98.2 (26 Mar 2018 13:59), Max: 99.4 (25 Mar 2018 21:28)  HR: 76 (26 Mar 2018 13:59) (73 - 76)  BP: 125/77 (26 Mar 2018 13:59) (103/64 - 125/77)  BP(mean): --  RR: 17 (26 Mar 2018 13:59) (16 - 17)  SpO2: 99% (26 Mar 2018 13:59) (97% - 99%)    I&O's Detail    25 Mar 2018 07:01  -  26 Mar 2018 07:00  --------------------------------------------------------  IN:    Oral Fluid: 440 mL  Total IN: 440 mL    OUT:    Drain: 10 mL    Indwelling Catheter - Urethral: 1050 mL    Voided: 850 mL  Total OUT: 1910 mL    Total NET: -1470 mL    PHYSICAL EXAM:  able to walk > 50 meters  Well nourished, well developed, comfortable, - acute distress; vital signs are monitored  Eyes: PERRLA, EOMI, -conjunctivitis, -scleritis   Head: no focal deficit, normocephalic,  no trauma  ENMT: moist tongue, no thrush, -nasal discharge, -hoarseness, normal hearing, -cough, -hemoptysis, trachea midline  Neck: supple, - lymphadenopathy,  -masses, -JVD  Respiratory: bilateral diminished breath sounds, -wheezing, -rhonchi, -rales, -crackles  Chest: -accessory muscle use, -paradoxical breathing  Cardiovascular: regular rate and sinus rhythm, S1 S2 normal, -S3, -S4, -murmur, -gallop, -rub  Gastrointestinal: soft, nontender, nondistended, normal bowel sounds, no hepatosplenomegaly  Genitourinary: -flank pain, -dysuria  Extremities: -clubbing, -cyanosis, + MILD edema    Vascular: peripheral pulses palpable 2+ bilaterally  Neurological: alert, oriented x 3, no focal deficit, -tremor   Skin: warm, dry, -erythema, iv sites REMOVED  Lymph nodes; no cervical, supraclavicular or axillary adenopathy  Psychiatric: cooperative, appropriate mood    MEDICATIONS  (STANDING):  albumin human  5% IVPB 250 milliLiter(s) IV Intermittent every 2 hours  amLODIPine   Tablet 5 milliGRAM(s) Oral daily  bisoprolol   Tablet 5 milliGRAM(s) Oral daily  BUpivacaine liposome 1.3% Injectable (no eMAR) 20 milliLiter(s) Local Injection once  docusate sodium 100 milliGRAM(s) Oral three times a day  lactated ringers. 1000 milliLiter(s) (125 mL/Hr) IV Continuous <Continuous>  levothyroxine 100 MICROGram(s) Oral daily  loratadine 10 milliGRAM(s) Oral daily  multivitamin 1 Tablet(s) Oral daily    MEDICATIONS  (PRN):  acetaminophen   Tablet 650 milliGRAM(s) Oral every 6 hours PRN For Temp greater than 38 C (100.4 F)  acetaminophen   Tablet. 650 milliGRAM(s) Oral every 6 hours PRN Mild Pain (1 - 3); headache  ALBUTerol    90 MICROgram(s) HFA Inhaler 2 Puff(s) Inhalation every 6 hours PRN Shortness of Breath and/or Wheezing  aluminum hydroxide/magnesium hydroxide/simethicone Suspension 30 milliLiter(s) Oral every 12 hours PRN Indigestion  artificial  tears Solution 1 Drop(s) Both EYES five times a day PRN Dry Eyes  bisacodyl 5 milliGRAM(s) Oral every 12 hours PRN Constipation  magnesium hydroxide Suspension 30 milliLiter(s) Oral every 12 hours PRN Constipation  metoclopramide Injectable 10 milliGRAM(s) IV Push once PRN Nausea and/or Vomiting  morphine  - Injectable 4 milliGRAM(s) IV Push every 3 hours PRN Severe Pain (7 - 10)  naloxone Injectable 0.1 milliGRAM(s) IV Push every 3 minutes PRN For ANY of the following changes in patient status:  A. RR LESS THAN 10 breaths per minute, B. Oxygen saturation LESS THAN 90%, C. Sedation score of 6  ondansetron Injectable 4 milliGRAM(s) IV Push every 6 hours PRN Nausea  oxyCODONE    IR 5 milliGRAM(s) Oral every 3 hours PRN Mild Pain (1 - 3)  oxyCODONE    IR 10 milliGRAM(s) Oral every 3 hours PRN Moderate Pain (4 - 6)    Allergies    aspirin (Unknown)  codeine (Other)  eggs (Unknown)  Flu Vaccine (Swelling)  Tylenol (Unknown)    Intolerances    LABS:                        9.2    6.3   )-----------( 140      ( 26 Mar 2018 07:49 )             28.4     03-26    139  |  101  |  9   ----------------------------<  103<H>  4.1   |  33<H>  |  0.53    Ca    8.9      26 Mar 2018 07:49    +DVT prophylaxis AMBULATING, IPCD  +Sleep  WELL    +Nutrition goals  ORAL  -Pain   DENIED  -Decubital ulcer  +GI prophylaxis (PPV, coagulopathy, Hx)  +Aspiration prophylaxis (45 degrees)  +Sedation/analgesia stopped once  +ID (phos, CH, mupi, SB)  -Delirium  +Cardiac Beta/  +Prevention  +Education  +Medication reviewed (drug-drug interactions, PDA)  Medical devices  Discussed with PGY, CCRN, family    RADIOLOGY & ADDITIONAL STUDIES:  all reviewed
General

## 2020-02-09 ENCOUNTER — FORM ENCOUNTER (OUTPATIENT)
Age: 70
End: 2020-02-09

## 2020-02-10 ENCOUNTER — APPOINTMENT (OUTPATIENT)
Dept: CT IMAGING | Facility: CLINIC | Age: 70
End: 2020-02-10
Payer: SELF-PAY

## 2020-02-10 ENCOUNTER — OUTPATIENT (OUTPATIENT)
Dept: OUTPATIENT SERVICES | Facility: HOSPITAL | Age: 70
LOS: 1 days | End: 2020-02-10

## 2020-02-10 DIAGNOSIS — Z98.890 OTHER SPECIFIED POSTPROCEDURAL STATES: Chronic | ICD-10-CM

## 2020-02-10 PROBLEM — I10 ESSENTIAL (PRIMARY) HYPERTENSION: Chronic | Status: ACTIVE | Noted: 2018-03-21

## 2020-02-10 PROBLEM — K21.9 GASTRO-ESOPHAGEAL REFLUX DISEASE WITHOUT ESOPHAGITIS: Chronic | Status: ACTIVE | Noted: 2018-03-21

## 2020-02-10 PROBLEM — J45.909 UNSPECIFIED ASTHMA, UNCOMPLICATED: Chronic | Status: ACTIVE | Noted: 2018-03-21

## 2020-02-10 PROBLEM — Z00.00 ENCOUNTER FOR PREVENTIVE HEALTH EXAMINATION: Status: ACTIVE | Noted: 2020-02-10

## 2020-02-10 PROBLEM — E03.9 HYPOTHYROIDISM, UNSPECIFIED: Chronic | Status: ACTIVE | Noted: 2018-03-21

## 2020-02-10 PROBLEM — M48.00 SPINAL STENOSIS, SITE UNSPECIFIED: Chronic | Status: ACTIVE | Noted: 2018-03-21

## 2020-02-10 PROCEDURE — 74177 CT ABD & PELVIS W/CONTRAST: CPT | Mod: 26

## 2023-05-11 ENCOUNTER — LABORATORY RESULT (OUTPATIENT)
Age: 73
End: 2023-05-11

## 2023-05-11 ENCOUNTER — APPOINTMENT (OUTPATIENT)
Dept: RHEUMATOLOGY | Facility: CLINIC | Age: 73
End: 2023-05-11
Payer: SELF-PAY

## 2023-05-11 VITALS
HEIGHT: 62 IN | BODY MASS INDEX: 29.24 KG/M2 | HEART RATE: 68 BPM | OXYGEN SATURATION: 95 % | SYSTOLIC BLOOD PRESSURE: 126 MMHG | WEIGHT: 158.9 LBS | DIASTOLIC BLOOD PRESSURE: 83 MMHG | TEMPERATURE: 97.9 F

## 2023-05-11 DIAGNOSIS — L29.9 PRURITUS, UNSPECIFIED: ICD-10-CM

## 2023-05-11 DIAGNOSIS — E05.90 THYROTOXICOSIS, UNSPECIFIED W/OUT THYROTOXIC CRISIS OR STORM: ICD-10-CM

## 2023-05-11 DIAGNOSIS — I10 ESSENTIAL (PRIMARY) HYPERTENSION: ICD-10-CM

## 2023-05-11 DIAGNOSIS — Z78.9 OTHER SPECIFIED HEALTH STATUS: ICD-10-CM

## 2023-05-11 PROCEDURE — 36415 COLL VENOUS BLD VENIPUNCTURE: CPT

## 2023-05-11 PROCEDURE — 99204 OFFICE O/P NEW MOD 45 MIN: CPT | Mod: 25

## 2023-05-11 RX ORDER — LEVOTHYROXINE SODIUM 100 UG/1
100 TABLET ORAL
Refills: 0 | Status: ACTIVE | COMMUNITY

## 2023-05-11 RX ORDER — METOPROLOL SUCCINATE 50 MG/1
50 TABLET, EXTENDED RELEASE ORAL
Refills: 0 | Status: ACTIVE | COMMUNITY

## 2023-05-11 RX ORDER — OMEPRAZOLE 20 MG/1
20 CAPSULE, DELAYED RELEASE ORAL
Refills: 0 | Status: ACTIVE | COMMUNITY

## 2023-05-11 RX ORDER — LORATADINE 10 MG/1
10 CAPSULE, LIQUID FILLED ORAL
Refills: 0 | Status: ACTIVE | COMMUNITY

## 2023-05-11 RX ORDER — AMLODIPINE BESYLATE 10 MG/1
10 TABLET ORAL
Refills: 0 | Status: ACTIVE | COMMUNITY

## 2023-05-11 NOTE — DATA REVIEWED
[FreeTextEntry1] : EXAM: CT ABDOMEN AND PELVIS OC IC \par PROCEDURE DATE: 02/10/2020 \par INTERPRETATION: CT of the abdomen and pelvis with contrast dated 2/10/2020 4:20 PM \par INDICATION: Left abdominal pain. \par TECHNIQUE: CT of abdomen and pelvis was performed using oral and intravenous contrast. Axial, \par sagittal and coronal images were produced and reviewed. \par INTRAVENOUS CONTRAST: 100 cc of Omnipaque 350. \par PRIOR STUDIES: None \par FINDINGS: Images of the lower chest demonstrate minimal the atelectasis. \par The liver is normal in appearance. No radiopaque gallstones are seen within the contracted \par gallbladder. Prominent wall enhancement of the gallbladder is noted of uncertain significance \par without wall thickening or pericholecystic infiltration. The pancreas is normal in appearance. No \par splenic abnormalities are seen. \par The adrenal glands are unremarkable. Cortical scarring and focal cortical thinning is seen in the \par anterior aspect of the left kidney. Smaller scarring is seen in the posterior aspect of the left kidney. \par The right kidney is unremarkable. There is no hydronephrosis or nephrolithiasis.\par There is moderate calcification of the abdominal aorta. No abdominal aortic aneurysm is seen. \par Splenic artery is extensively calcified. There is a 0.6 cm round calcification in the splenic hilum which \par could represent a small splenic artery aneurysm. No lymphadenopathy is seen. \par Stomach is mildly distended and contains fluid residue. Appendix is not identified with certainty. \par There is no evidence of bowel obstruction. No ascites is identified. \par Images of the pelvis demonstrate small calcified uterine fibroid. No adnexal mass is seen. Bladder is \par unremarkable. \par Evaluation of the osseous structures demonstrates postoperative changes with posterior fusion of L5-\par S1. There is obscuration of L5-S1 intervertebral disc space and 1 cm anterolisthesis at this level. \par IMPRESSION: \par No abnormality is seen to cause left upper quadrant pain. Other chronic/ancillary findings as above.

## 2023-05-11 NOTE — PHYSICAL EXAM
[General Appearance - Alert] : alert [General Appearance - In No Acute Distress] : in no acute distress [General Appearance - Well-Appearing] : healthy appearing [Sclera] : the sclera and conjunctiva were normal [Examination Of The Oral Cavity] : the lips and gums were normal [Oropharynx] : the oropharynx was normal [] : no respiratory distress [Respiration, Rhythm And Depth] : normal respiratory rhythm and effort [Exaggerated Use Of Accessory Muscles For Inspiration] : no accessory muscle use [Abnormal Walk] : normal gait [Oriented To Time, Place, And Person] : oriented to person, place, and time [Affect] : the affect was normal [Mood] : the mood was normal [FreeTextEntry1] : Decreased active range of motion of the left shoulder, improved with assistance from the right upper extremity.  No active synovitis noted.  Tenderness over the anterior lower extremities bilateral

## 2023-05-11 NOTE — HISTORY OF PRESENT ILLNESS
[FreeTextEntry1] : May 11, 2023\par \par 73 year old woman with history of hypothyroidism, spinal stenosis s/p fusion surgery 2018, presents for initial evaluation, referred by Dr. Mcdonough\par Pt states having increasing allergy symptoms, mainly pruritus at night, for example has had reactions to Tylenol, flu shot, some foods, never followed with allergist or had allergy testing \par Burning "electrical pain" in feet, otherwise denies joint stiffness or weakness\par Patient also s/p left  shoulder fracture about 9 months ago requiring ER admission complicated by blood clot, per pt, treated for a few months with  AC; no other falls or fractures or blood clots. Patient still with limited active range of motion of the left shoulder, recently evaluated by orthopedic, referred for second opinion.\par States had DEXA scan recently, revealing no osteoporosis per pt, results not available for my review at this time\par Remote history of carpal tunnel release surgery bilaterally \par History of steroid injections in bilateral feet 2 years ago, states surgery was considered, requires orthotics\par Follows with ortho s/p spinal fusion surgery with lingering leg/foot pain, appt tomorrow \par Has 7 children, 1 miscarriage \par No family history of lupus, RA, other autoimmune disease \par Denies joint pain, swelling, rash,  oral ulcers, weight loss, fatigue, fever, or chills\par No etoh or smoking use \par \par No recent blood tests \par Had back and shoulder\par Leaving on May 22 to return home\par Known allergies to flu shots, tylenol and codeine\par Past two years, something feels different, feels overall body pruritus\par Unclear etiology, feels may be related to food\par No changes in medications\par Taking loratidine, does not help\par taking loratidine for two years. stronger for the night but did not like due to side effect\par Looks like mild rash intermittently\par Tried different creams, jergens\par No changes in laundry detergent\par Does not feel worse in the sun\par Does not feel worsens after hot showers or with sun exposure\par Also feels dryness in the eyes as well, as well as itchiness of the eyes as well.

## 2023-05-11 NOTE — ASSESSMENT
[FreeTextEntry1] : 73-year-old woman referred for rheumatology evaluation.  Patient with reports of pruritus for the past 1 to 2 years, diffusely present, without clear skin lesions.  Etiology of pruritus is unclear, also reports dry eyes as well as dry skin, given symptoms will obtain MIKE with SSA and SSB to evaluate for possible sicca symptoms related to Sjogren's syndrome.  Patient is concerned about possible allergies as etiology of symptoms, will refer to allergy for further evaluation as well as check CBC, CMP, IgE levels as well as SPEP.  Patient will follow-up with orthopedist for left shoulder pain and decreased range of motion.  Given previous fractures status post fall, patient reports completing bone density, does not report a history of osteoporosis, although results are not available to me at this time for review.  Further management pending results.

## 2023-05-13 LAB
ALBUMIN SERPL ELPH-MCNC: 4 G/DL
ALP BLD-CCNC: 98 U/L
ALT SERPL-CCNC: 21 U/L
ANACR T: NEGATIVE
ANION GAP SERPL CALC-SCNC: 12 MMOL/L
AST SERPL-CCNC: 20 U/L
BASOPHILS # BLD AUTO: 0.03 K/UL
BASOPHILS NFR BLD AUTO: 0.6 %
BILIRUB SERPL-MCNC: <0.2 MG/DL
BUN SERPL-MCNC: 12 MG/DL
CALCIUM SERPL-MCNC: 9.9 MG/DL
CHLORIDE SERPL-SCNC: 105 MMOL/L
CO2 SERPL-SCNC: 25 MMOL/L
CREAT SERPL-MCNC: 0.7 MG/DL
EGFR: 92 ML/MIN/1.73M2
EOSINOPHIL # BLD AUTO: 0.09 K/UL
EOSINOPHIL NFR BLD AUTO: 1.8 %
GLUCOSE SERPL-MCNC: 87 MG/DL
HCT VFR BLD CALC: 42.8 %
HGB BLD-MCNC: 13.6 G/DL
IMM GRANULOCYTES NFR BLD AUTO: 0.2 %
LYMPHOCYTES # BLD AUTO: 1.59 K/UL
LYMPHOCYTES NFR BLD AUTO: 32.2 %
MAN DIFF?: NORMAL
MCHC RBC-ENTMCNC: 28.3 PG
MCHC RBC-ENTMCNC: 31.8 GM/DL
MCV RBC AUTO: 89.2 FL
MONOCYTES # BLD AUTO: 0.46 K/UL
MONOCYTES NFR BLD AUTO: 9.3 %
NEUTROPHILS # BLD AUTO: 2.76 K/UL
NEUTROPHILS NFR BLD AUTO: 55.9 %
PLATELET # BLD AUTO: 263 K/UL
POTASSIUM SERPL-SCNC: 4.3 MMOL/L
PROT SERPL-MCNC: 6.9 G/DL
RBC # BLD: 4.8 M/UL
RBC # FLD: 15.2 %
SODIUM SERPL-SCNC: 142 MMOL/L
WBC # FLD AUTO: 4.94 K/UL

## 2023-05-15 LAB
ENA SS-A AB SER IA-ACNC: <0.2 AL
ENA SS-B AB SER IA-ACNC: <0.2 AL
IGE SER-MCNC: 649 KU/L

## 2023-05-18 LAB
ALBUMIN MFR SERPL ELPH: 54.5 %
ALBUMIN SERPL-MCNC: 3.8 G/DL
ALBUMIN/GLOB SERPL: 1.2 RATIO
ALPHA1 GLOB MFR SERPL ELPH: 4.2 %
ALPHA1 GLOB SERPL ELPH-MCNC: 0.3 G/DL
ALPHA2 GLOB MFR SERPL ELPH: 10.3 %
ALPHA2 GLOB SERPL ELPH-MCNC: 0.7 G/DL
B-GLOBULIN MFR SERPL ELPH: 14.2 %
B-GLOBULIN SERPL ELPH-MCNC: 1 G/DL
GAMMA GLOB FLD ELPH-MCNC: 1.2 G/DL
GAMMA GLOB MFR SERPL ELPH: 16.8 %
INTERPRETATION SERPL IEP-IMP: NORMAL
PROT SERPL-MCNC: 6.9 G/DL
PROT SERPL-MCNC: 6.9 G/DL

## 2024-02-14 NOTE — PHYSICAL THERAPY INITIAL EVALUATION ADULT - STANDING BALANCE: STATIC
fair balance
1) Monitor weights, PO intake/diet tolerance, skin integrity, pertinent labs.   2) Please consistently document % PO intake in nursing flowsheets to assess adequacy of nutritional intake/monitor need for further nutritional intervention(s). 
fair balance/with rolling walker
